# Patient Record
Sex: MALE | Race: WHITE | NOT HISPANIC OR LATINO | Employment: OTHER | URBAN - METROPOLITAN AREA
[De-identification: names, ages, dates, MRNs, and addresses within clinical notes are randomized per-mention and may not be internally consistent; named-entity substitution may affect disease eponyms.]

---

## 2023-05-22 ENCOUNTER — HOSPITAL ENCOUNTER (OUTPATIENT)
Dept: DATA CONVERSION | Facility: HOSPITAL | Age: 68
End: 2023-05-22
Attending: STUDENT IN AN ORGANIZED HEALTH CARE EDUCATION/TRAINING PROGRAM | Admitting: STUDENT IN AN ORGANIZED HEALTH CARE EDUCATION/TRAINING PROGRAM
Payer: MEDICARE

## 2023-05-22 DIAGNOSIS — I10 ESSENTIAL (PRIMARY) HYPERTENSION: ICD-10-CM

## 2023-05-22 DIAGNOSIS — E11.9 TYPE 2 DIABETES MELLITUS WITHOUT COMPLICATIONS (MULTI): ICD-10-CM

## 2023-05-22 DIAGNOSIS — G47.33 OBSTRUCTIVE SLEEP APNEA (ADULT) (PEDIATRIC): ICD-10-CM

## 2023-05-22 DIAGNOSIS — I82.412 ACUTE EMBOLISM AND THROMBOSIS OF LEFT FEMORAL VEIN (MULTI): ICD-10-CM

## 2023-09-07 PROBLEM — G56.00 CARPAL TUNNEL SYNDROME: Status: ACTIVE | Noted: 2023-09-07

## 2023-09-07 PROBLEM — R29.818 SUSPECTED SLEEP APNEA: Status: ACTIVE | Noted: 2023-09-07

## 2023-09-07 PROBLEM — R06.83 SNORING: Status: ACTIVE | Noted: 2023-09-07

## 2023-09-07 PROBLEM — I10 BENIGN ESSENTIAL HYPERTENSION: Status: ACTIVE | Noted: 2023-09-07

## 2023-09-07 PROBLEM — K59.09 CHRONIC CONSTIPATION: Status: ACTIVE | Noted: 2023-09-07

## 2023-09-07 PROBLEM — F79 INTELLECTUAL DISABILITY: Status: ACTIVE | Noted: 2023-09-07

## 2023-09-07 PROBLEM — R33.9 BLADDER RETENTION OF URINE: Status: ACTIVE | Noted: 2023-09-07

## 2023-09-07 PROBLEM — N40.1 BENIGN NODULAR PROSTATIC HYPERPLASIA WITH LOWER URINARY TRACT SYMPTOMS: Status: ACTIVE | Noted: 2023-09-07

## 2023-09-07 PROBLEM — F43.10 PTSD (POST-TRAUMATIC STRESS DISORDER): Status: ACTIVE | Noted: 2023-09-07

## 2023-09-07 PROBLEM — G40.909 NONINTRACTABLE EPILEPSY WITHOUT STATUS EPILEPTICUS (MULTI): Status: ACTIVE | Noted: 2023-09-07
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
## 2023-09-07 PROBLEM — H25.9 AGE-RELATED CATARACT OF BOTH EYES: Status: ACTIVE | Noted: 2023-09-07

## 2023-09-07 PROBLEM — M19.90 ARTHRITIS: Status: ACTIVE | Noted: 2023-09-07

## 2023-09-07 PROBLEM — N40.0 ENLARGED PROSTATE WITHOUT LOWER URINARY TRACT SYMPTOMS (LUTS): Status: ACTIVE | Noted: 2023-09-07

## 2023-09-07 PROBLEM — R76.8 POSITIVE GAD ANTIBODY: Status: ACTIVE | Noted: 2023-09-07

## 2023-09-07 PROBLEM — F41.1 GENERALIZED ANXIETY DISORDER: Status: ACTIVE | Noted: 2023-09-07

## 2023-09-07 PROBLEM — F32.9 MDD (MAJOR DEPRESSIVE DISORDER): Status: ACTIVE | Noted: 2023-09-07

## 2023-09-07 PROBLEM — E87.20 LACTIC ACIDOSIS: Status: ACTIVE | Noted: 2023-09-07

## 2023-09-07 PROBLEM — Z96.612 STATUS POST LEFT SHOULDER HEMIARTHROPLASTY: Status: ACTIVE | Noted: 2023-09-07

## 2023-09-07 PROBLEM — F41.9 ANXIETY: Status: ACTIVE | Noted: 2023-09-07

## 2023-09-07 PROBLEM — K92.2 GI BLEED: Status: ACTIVE | Noted: 2023-09-07

## 2023-09-07 PROBLEM — S22.49XA MULTIPLE RIB FRACTURES: Status: ACTIVE | Noted: 2023-09-07

## 2023-09-07 PROBLEM — E11.42 DIABETIC POLYNEUROPATHY ASSOCIATED WITH TYPE 2 DIABETES MELLITUS (MULTI): Status: ACTIVE | Noted: 2023-09-07

## 2023-09-07 PROBLEM — G89.29 OTHER CHRONIC PAIN: Status: ACTIVE | Noted: 2023-09-07

## 2023-09-07 PROBLEM — E66.01 OBESITY, MORBID (MULTI): Status: ACTIVE | Noted: 2023-09-07

## 2023-09-07 PROBLEM — G40.909 SEIZURE DISORDER (MULTI): Status: ACTIVE | Noted: 2023-09-07

## 2023-09-07 PROBLEM — L72.3 SEBACEOUS CYST: Status: ACTIVE | Noted: 2023-09-07

## 2023-09-07 PROBLEM — I73.9 PVD (PERIPHERAL VASCULAR DISEASE) (CMS-HCC): Status: ACTIVE | Noted: 2023-09-07

## 2023-09-07 PROBLEM — R26.9 ABNORMAL GAIT: Status: ACTIVE | Noted: 2023-09-07

## 2023-09-07 PROBLEM — S93.314A DISLOCATION OF RIGHT SUBTALAR JOINT: Status: ACTIVE | Noted: 2023-09-07

## 2023-09-07 PROBLEM — H90.3 SENSORINEURAL HEARING LOSS (SNHL) OF BOTH EARS: Status: ACTIVE | Noted: 2023-09-07

## 2023-09-07 PROBLEM — R39.198 ABNORMAL URINATION: Status: ACTIVE | Noted: 2023-09-07

## 2023-09-07 PROBLEM — M81.0 OSTEOPOROSIS: Status: ACTIVE | Noted: 2023-09-07

## 2023-09-07 PROBLEM — S72.102A: Status: ACTIVE | Noted: 2023-09-07

## 2023-09-07 PROBLEM — J30.9 ALLERGIC RHINITIS: Status: ACTIVE | Noted: 2023-09-07

## 2023-09-07 PROBLEM — R33.9 URINARY RETENTION WITH INCOMPLETE BLADDER EMPTYING: Status: ACTIVE | Noted: 2023-09-07

## 2023-09-07 PROBLEM — Z04.9 CONDITION NOT FOUND: Status: ACTIVE | Noted: 2023-09-07

## 2023-09-07 PROBLEM — H93.13 BILATERAL TINNITUS: Status: ACTIVE | Noted: 2023-09-07

## 2023-09-07 PROBLEM — G47.00 INSOMNIA: Status: ACTIVE | Noted: 2023-09-07

## 2023-09-07 PROBLEM — E11.9 TYPE 2 DIABETES MELLITUS WITHOUT COMPLICATION (MULTI): Status: ACTIVE | Noted: 2023-09-07

## 2023-09-07 PROBLEM — S42.302A LEFT HUMERAL FRACTURE: Status: ACTIVE | Noted: 2023-09-07

## 2023-09-07 PROBLEM — G47.33 OBSTRUCTIVE SLEEP APNEA: Status: ACTIVE | Noted: 2023-09-07

## 2023-09-07 PROBLEM — H60.8X3 CHRONIC ECZEMATOUS OTITIS EXTERNA OF BOTH EARS: Status: ACTIVE | Noted: 2023-09-07

## 2023-09-07 PROBLEM — M25.462: Status: ACTIVE | Noted: 2023-09-07

## 2023-09-07 PROBLEM — H91.90 HEARING LOSS: Status: ACTIVE | Noted: 2023-09-07

## 2023-09-07 LAB
ALANINE AMINOTRANSFERASE (SGPT) (U/L) IN SER/PLAS: 16 U/L (ref 10–52)
ALBUMIN (G/DL) IN SER/PLAS: 4.1 G/DL (ref 3.4–5)
ALKALINE PHOSPHATASE (U/L) IN SER/PLAS: 161 U/L (ref 33–136)
ANION GAP IN SER/PLAS: 16 MMOL/L (ref 10–20)
ASPARTATE AMINOTRANSFERASE (SGOT) (U/L) IN SER/PLAS: 15 U/L (ref 9–39)
BASOPHILS (10*3/UL) IN BLOOD BY AUTOMATED COUNT: 0.04 X10E9/L (ref 0–0.1)
BASOPHILS/100 LEUKOCYTES IN BLOOD BY AUTOMATED COUNT: 0.6 % (ref 0–2)
BILIRUBIN TOTAL (MG/DL) IN SER/PLAS: 0.4 MG/DL (ref 0–1.2)
CALCIUM (MG/DL) IN SER/PLAS: 9 MG/DL (ref 8.6–10.3)
CARBON DIOXIDE, TOTAL (MMOL/L) IN SER/PLAS: 22 MMOL/L (ref 21–32)
CHLORIDE (MMOL/L) IN SER/PLAS: 103 MMOL/L (ref 98–107)
CHOLESTEROL (MG/DL) IN SER/PLAS: 141 MG/DL (ref 0–199)
CHOLESTEROL IN HDL (MG/DL) IN SER/PLAS: 49.9 MG/DL
CHOLESTEROL/HDL RATIO: 2.8
CREATININE (MG/DL) IN SER/PLAS: 0.83 MG/DL (ref 0.5–1.3)
EOSINOPHILS (10*3/UL) IN BLOOD BY AUTOMATED COUNT: 0.08 X10E9/L (ref 0–0.7)
EOSINOPHILS/100 LEUKOCYTES IN BLOOD BY AUTOMATED COUNT: 1.3 % (ref 0–6)
ERYTHROCYTE DISTRIBUTION WIDTH (RATIO) BY AUTOMATED COUNT: 13.7 % (ref 11.5–14.5)
ERYTHROCYTE MEAN CORPUSCULAR HEMOGLOBIN CONCENTRATION (G/DL) BY AUTOMATED: 29.7 G/DL (ref 32–36)
ERYTHROCYTE MEAN CORPUSCULAR VOLUME (FL) BY AUTOMATED COUNT: 97 FL (ref 80–100)
ERYTHROCYTES (10*6/UL) IN BLOOD BY AUTOMATED COUNT: 4.81 X10E12/L (ref 4.5–5.9)
GFR MALE: >90 ML/MIN/1.73M2
GLUCOSE (MG/DL) IN SER/PLAS: 143 MG/DL (ref 74–99)
HEMATOCRIT (%) IN BLOOD BY AUTOMATED COUNT: 46.8 % (ref 41–52)
HEMOGLOBIN (G/DL) IN BLOOD: 13.9 G/DL (ref 13.5–17.5)
IMMATURE GRANULOCYTES/100 LEUKOCYTES IN BLOOD BY AUTOMATED COUNT: 0.3 % (ref 0–0.9)
LDL: 69 MG/DL (ref 0–99)
LEUKOCYTES (10*3/UL) IN BLOOD BY AUTOMATED COUNT: 6.4 X10E9/L (ref 4.4–11.3)
LYMPHOCYTES (10*3/UL) IN BLOOD BY AUTOMATED COUNT: 1.81 X10E9/L (ref 1.2–4.8)
LYMPHOCYTES/100 LEUKOCYTES IN BLOOD BY AUTOMATED COUNT: 28.5 % (ref 13–44)
MONOCYTES (10*3/UL) IN BLOOD BY AUTOMATED COUNT: 0.57 X10E9/L (ref 0.1–1)
MONOCYTES/100 LEUKOCYTES IN BLOOD BY AUTOMATED COUNT: 9 % (ref 2–10)
NEUTROPHILS (10*3/UL) IN BLOOD BY AUTOMATED COUNT: 3.83 X10E9/L (ref 1.2–7.7)
NEUTROPHILS/100 LEUKOCYTES IN BLOOD BY AUTOMATED COUNT: 60.3 % (ref 40–80)
PLATELETS (10*3/UL) IN BLOOD AUTOMATED COUNT: 300 X10E9/L (ref 150–450)
POTASSIUM (MMOL/L) IN SER/PLAS: 5 MMOL/L (ref 3.5–5.3)
PROTEIN TOTAL: 7 G/DL (ref 6.4–8.2)
SODIUM (MMOL/L) IN SER/PLAS: 136 MMOL/L (ref 136–145)
THYROTROPIN (MIU/L) IN SER/PLAS BY DETECTION LIMIT <= 0.05 MIU/L: 2.49 MIU/L (ref 0.44–3.98)
TRIGLYCERIDE (MG/DL) IN SER/PLAS: 111 MG/DL (ref 0–149)
UREA NITROGEN (MG/DL) IN SER/PLAS: 18 MG/DL (ref 6–23)
VLDL: 22 MG/DL (ref 0–40)

## 2023-09-07 RX ORDER — LISINOPRIL 5 MG/1
1 TABLET ORAL EVERY 24 HOURS
COMMUNITY
Start: 2018-05-30 | End: 2024-03-14 | Stop reason: SDUPTHER

## 2023-09-07 RX ORDER — LISINOPRIL 10 MG/1
TABLET ORAL
Status: ON HOLD | COMMUNITY
End: 2023-12-08 | Stop reason: ALTCHOICE

## 2023-09-07 RX ORDER — DOCUSATE SODIUM 100 MG/1
1 CAPSULE, LIQUID FILLED ORAL 2 TIMES DAILY
Status: ON HOLD | COMMUNITY
Start: 2016-02-22 | End: 2023-12-08 | Stop reason: ALTCHOICE

## 2023-09-07 RX ORDER — TALC
3 POWDER (GRAM) TOPICAL EVERY 24 HOURS
COMMUNITY
Start: 2018-05-30 | End: 2023-10-09 | Stop reason: WASHOUT

## 2023-09-07 RX ORDER — TRAZODONE HYDROCHLORIDE 100 MG/1
100 TABLET ORAL
Status: ON HOLD | COMMUNITY
Start: 2022-12-09 | End: 2023-12-08 | Stop reason: ALTCHOICE

## 2023-09-07 RX ORDER — PHENYTOIN SODIUM 100 MG/1
2 CAPSULE, EXTENDED RELEASE ORAL 3 TIMES DAILY
COMMUNITY
Start: 2016-02-22 | End: 2024-06-06

## 2023-09-07 RX ORDER — HYDROCODONE BITARTRATE AND ACETAMINOPHEN 5; 325 MG/1; MG/1
TABLET ORAL
Status: ON HOLD | COMMUNITY
End: 2023-12-08 | Stop reason: ALTCHOICE

## 2023-09-07 RX ORDER — NYSTATIN 100000 [USP'U]/G
POWDER TOPICAL 2 TIMES DAILY
Status: ON HOLD | COMMUNITY
Start: 2022-09-16 | End: 2023-12-08 | Stop reason: ALTCHOICE

## 2023-09-07 RX ORDER — PANTOPRAZOLE SODIUM 40 MG/1
1 TABLET, DELAYED RELEASE ORAL EVERY 24 HOURS
COMMUNITY
Start: 2016-06-27 | End: 2024-01-09

## 2023-09-07 RX ORDER — MULTIVIT-MIN/IRON FUM/FOLIC AC 7.5 MG-4
1 TABLET ORAL DAILY
Status: ON HOLD | COMMUNITY
Start: 2016-06-27 | End: 2023-12-08 | Stop reason: ALTCHOICE

## 2023-09-07 RX ORDER — MULTIVITAMIN
1 TABLET ORAL 2 TIMES DAILY
COMMUNITY
Start: 2023-08-11 | End: 2023-12-13

## 2023-09-07 RX ORDER — LISINOPRIL 20 MG/1
TABLET ORAL
Status: ON HOLD | COMMUNITY
Start: 2016-02-22 | End: 2023-12-08 | Stop reason: ALTCHOICE

## 2023-09-07 RX ORDER — CHOLECALCIFEROL (VITAMIN D3) 25 MCG
25 TABLET ORAL DAILY
Status: ON HOLD | COMMUNITY
End: 2023-12-08 | Stop reason: ALTCHOICE

## 2023-09-07 RX ORDER — FINASTERIDE 5 MG/1
1 TABLET, FILM COATED ORAL EVERY 24 HOURS
COMMUNITY
End: 2024-03-13

## 2023-09-07 RX ORDER — VILAZODONE HYDROCHLORIDE 40 MG/1
1 TABLET ORAL EVERY 24 HOURS
COMMUNITY
Start: 2021-09-08 | End: 2024-02-28 | Stop reason: ALTCHOICE

## 2023-09-07 RX ORDER — LORATADINE 10 MG/1
TABLET ORAL
Status: ON HOLD | COMMUNITY
Start: 2016-02-22 | End: 2023-12-08 | Stop reason: ALTCHOICE

## 2023-09-07 RX ORDER — MELATONIN 10 MG
1 CAPSULE ORAL NIGHTLY
COMMUNITY
Start: 2023-05-25

## 2023-09-07 RX ORDER — METHOCARBAMOL 500 MG/1
500 TABLET, FILM COATED ORAL 4 TIMES DAILY
Status: ON HOLD | COMMUNITY
Start: 2023-03-27 | End: 2023-12-08 | Stop reason: ALTCHOICE

## 2023-09-07 RX ORDER — OMEPRAZOLE 40 MG/1
1 CAPSULE, DELAYED RELEASE ORAL DAILY
Status: ON HOLD | COMMUNITY
Start: 2016-02-22 | End: 2023-12-08 | Stop reason: ALTCHOICE

## 2023-09-07 RX ORDER — QUETIAPINE FUMARATE 25 MG/1
25 TABLET, FILM COATED ORAL EVERY 24 HOURS
Status: ON HOLD | COMMUNITY
Start: 2021-09-08 | End: 2023-12-08 | Stop reason: ALTCHOICE

## 2023-09-07 RX ORDER — CELECOXIB 200 MG/1
1 CAPSULE ORAL DAILY
Status: ON HOLD | COMMUNITY
Start: 2016-02-22 | End: 2023-12-08 | Stop reason: ALTCHOICE

## 2023-09-07 RX ORDER — OXYBUTYNIN CHLORIDE 5 MG/1
1 TABLET ORAL 3 TIMES DAILY
COMMUNITY
Start: 2023-08-05

## 2023-09-07 RX ORDER — FUROSEMIDE 20 MG/1
TABLET ORAL
Status: ON HOLD | COMMUNITY
Start: 2016-02-22 | End: 2023-12-08 | Stop reason: ALTCHOICE

## 2023-09-07 RX ORDER — PHENYTOIN SODIUM 200 MG/1
200 CAPSULE, EXTENDED RELEASE ORAL 3 TIMES DAILY
Status: ON HOLD | COMMUNITY
End: 2023-12-08 | Stop reason: ALTCHOICE

## 2023-09-07 RX ORDER — SERTRALINE HYDROCHLORIDE 50 MG/1
1 TABLET, FILM COATED ORAL DAILY
Status: ON HOLD | COMMUNITY
Start: 2016-02-22 | End: 2023-12-08 | Stop reason: ALTCHOICE

## 2023-09-07 RX ORDER — BUSPIRONE HYDROCHLORIDE 10 MG/1
1 TABLET ORAL EVERY 8 HOURS
COMMUNITY
Start: 2021-09-08

## 2023-09-07 RX ORDER — ACETAMINOPHEN 500 MG/1
650 TABLET ORAL EVERY 8 HOURS PRN
Status: ON HOLD | COMMUNITY
Start: 2023-01-18 | End: 2023-12-08 | Stop reason: ALTCHOICE

## 2023-09-07 RX ORDER — ASPIRIN 81 MG/1
1 TABLET ORAL DAILY
COMMUNITY
Start: 2016-02-22

## 2023-09-07 RX ORDER — METFORMIN HYDROCHLORIDE 500 MG/1
500 TABLET, EXTENDED RELEASE ORAL
Status: ON HOLD | COMMUNITY
Start: 2016-02-22 | End: 2023-12-08 | Stop reason: ALTCHOICE

## 2023-09-07 RX ORDER — TAMSULOSIN HYDROCHLORIDE 0.4 MG/1
0.4 CAPSULE ORAL DAILY
Status: ON HOLD | COMMUNITY
Start: 2016-02-22 | End: 2023-12-08 | Stop reason: ALTCHOICE

## 2023-09-07 RX ORDER — BLOOD-GLUCOSE CONTROL, NORMAL
EACH MISCELLANEOUS
Status: ON HOLD | COMMUNITY
Start: 2022-05-16 | End: 2023-12-08 | Stop reason: ALTCHOICE

## 2023-09-07 RX ORDER — ALPRAZOLAM 0.25 MG/1
TABLET ORAL
Status: ON HOLD | COMMUNITY
Start: 2016-02-22 | End: 2023-12-08 | Stop reason: ALTCHOICE

## 2023-09-07 RX ORDER — MENTHOL 40 MG/ML
GEL TOPICAL 4 TIMES DAILY PRN
Status: ON HOLD | COMMUNITY
End: 2023-12-08 | Stop reason: ALTCHOICE

## 2023-09-07 RX ORDER — OXYCODONE HYDROCHLORIDE 5 MG/1
5 TABLET ORAL EVERY 4 HOURS PRN
Status: ON HOLD | COMMUNITY
Start: 2016-02-22 | End: 2023-12-08 | Stop reason: ALTCHOICE

## 2023-09-07 RX ORDER — TRAZODONE HYDROCHLORIDE 50 MG/1
1 TABLET ORAL EVERY 24 HOURS
COMMUNITY
Start: 2016-02-22

## 2023-09-07 RX ORDER — POLYETHYLENE GLYCOL 3350 17 G/17G
17 POWDER, FOR SOLUTION ORAL
Status: ON HOLD | COMMUNITY
Start: 2016-02-22 | End: 2023-12-08 | Stop reason: ALTCHOICE

## 2023-09-07 RX ORDER — FLUTICASONE PROPIONATE 50 MCG
2 SPRAY, SUSPENSION (ML) NASAL DAILY
COMMUNITY
End: 2024-05-30 | Stop reason: SDUPTHER

## 2023-09-08 LAB — PROSTATE SPECIFIC AG (NG/ML) IN SER/PLAS: 0.22 NG/ML (ref 0–4)

## 2023-10-09 PROBLEM — E11.49 DIABETES MELLITUS TYPE 2 WITH NEUROLOGICAL MANIFESTATIONS (MULTI): Status: ACTIVE | Noted: 2023-10-09

## 2023-10-09 RX ORDER — MELATONIN 3 MG
CAPSULE ORAL EVERY 24 HOURS
Status: ON HOLD | COMMUNITY
Start: 2018-05-30 | End: 2023-12-08 | Stop reason: ALTCHOICE

## 2023-10-09 RX ORDER — MONTELUKAST SODIUM 4 MG/1
TABLET, CHEWABLE ORAL EVERY 24 HOURS
COMMUNITY
Start: 2023-09-07 | End: 2024-01-09

## 2023-10-11 ENCOUNTER — ANCILLARY PROCEDURE (OUTPATIENT)
Dept: RADIOLOGY | Facility: CLINIC | Age: 68
End: 2023-10-11
Payer: MEDICARE

## 2023-10-11 ENCOUNTER — OFFICE VISIT (OUTPATIENT)
Dept: ORTHOPEDIC SURGERY | Facility: CLINIC | Age: 68
End: 2023-10-11
Payer: MEDICARE

## 2023-10-11 DIAGNOSIS — Z96.649 PERIPROSTHETIC FRACTURE OF SHAFT OF FEMUR: ICD-10-CM

## 2023-10-11 DIAGNOSIS — M97.8XXA PERIPROSTHETIC FRACTURE OF SHAFT OF FEMUR: ICD-10-CM

## 2023-10-11 PROCEDURE — 73552 X-RAY EXAM OF FEMUR 2/>: CPT | Mod: LEFT SIDE | Performed by: RADIOLOGY

## 2023-10-11 PROCEDURE — 99213 OFFICE O/P EST LOW 20 MIN: CPT | Performed by: ORTHOPAEDIC SURGERY

## 2023-10-11 PROCEDURE — 3008F BODY MASS INDEX DOCD: CPT | Performed by: ORTHOPAEDIC SURGERY

## 2023-10-11 PROCEDURE — 4010F ACE/ARB THERAPY RXD/TAKEN: CPT | Performed by: ORTHOPAEDIC SURGERY

## 2023-10-11 PROCEDURE — 73552 X-RAY EXAM OF FEMUR 2/>: CPT | Mod: LT

## 2023-10-11 NOTE — PROGRESS NOTES
Shon Boyd Jr. is 10 months post-op from removal of hardware and intramedullary nail left femur fracture December 2023 he is doing well at this point.   Denies fevers or chills.  Denies drainage from the wound.  he reports no additional symptoms or concerns. No shortness of breath or chest pain. No calf swelling or pain.  He mostly is using a walker and a wheelchair.  He lives in a assisted living.    The patients full medical history, surgical history, medications, allergies, family, medical history, social history, and a complete 14 point review of systems is documented in the medical record on the signed, scanned medical intake sheet or reviewed in the history of present illness. Review of systems otherwise negative    No past medical history on file.    Medication Documentation Review Audit    **Prior to Admission medications have not yet been reviewed**         No Known Allergies    Social History     Socioeconomic History    Marital status:      Spouse name: Not on file    Number of children: Not on file    Years of education: Not on file    Highest education level: Not on file   Occupational History    Not on file   Tobacco Use    Smoking status: Not on file    Smokeless tobacco: Not on file   Substance and Sexual Activity    Alcohol use: Not on file    Drug use: Not on file    Sexual activity: Not on file   Other Topics Concern    Not on file   Social History Narrative    Not on file     Social Determinants of Health     Financial Resource Strain: Not on file   Food Insecurity: Not on file   Transportation Needs: Not on file   Physical Activity: Not on file   Stress: Not on file   Social Connections: Not on file   Intimate Partner Violence: Not on file   Housing Stability: Not on file       Past Surgical History:   Procedure Laterality Date    CT GUIDED PERCUTANEOUS BIOPSY LUNG  8/8/2012    CT GUIDED PERCUTANEOUS BIOPSY LUNG 8/8/2012 [facility] LEGACY       Gen: The patient is alert and oriented ×3, is  in no acute distress, and appear their stated age and weight.    Psychiatric: Mood and affect are appropriate.    Eyes: Sclera are white, and pupils are round and symmetric.    ENT: Mucous membranes are moist.     Neck: Supple. Thyroid is midline.    Respiratory: Respirations are nonlabored, chest rise is symmetric.    Cardiac: Rate is regular by palpation of distal pulses.     Abdomen: Nondistended.    Integument: No obvious cutaneous lesions are noted. No signs of lymphangitis. No signs of systemic edema.  side: left lower extremity :  his  surgical incisions are healing well, without evidence of erythema, fluctuance, drainage, or infection.  The skin around the incision is intact.  Distally neurovascular exam is stable.  There is appropriate tenderness to palpation in the sonja-incisional area. No calf swelling or tenderness to palpation.      I personally reviewed multiple views of left femur were obtained in the office today demonstrate maintenance of reduction, interval healing, and a stable position of the hardware.  There is one fracture line medial and posterior that has some incomplete healing but anterior and lateral.  Complete healing most likely part of a butterfly fragment.      Shon Boyd Jr. is a 67 y.o. patient status post left femur intramedullary nail and removal of hardware December 2022 I went over his x-rays in detail today. he is WBAT of the side: leftlower extremity. ~He/she~ is range of motion as tolerated of the side: left lower extremity.  I stressed the importance of physical therapy on overall functional outcome.  I would like him to continue physical therapy.  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  He would like to the need to get a total hip arthroplasty in order to alleviate this pain.  The posterior buttock pain just like the from sciatica as it is very sporadic  and shoots down his leg.  He is very low demand and is using a walker at baseline.  There is no evidence of hardware loosening or dynamization and he does not really have any thigh pain to indicate that he does incomplete healing of his fracture.  We will continue to monitor.  I answered all patient's questions he agrees with treatment plan.  I will see him back in Follow-up 6 month(s)with repeat AP pelvis and 2 views of the left femur.  Clemente Renteria    Department of Orthopaedic Trauma Surgery

## 2023-11-17 ENCOUNTER — TELEPHONE (OUTPATIENT)
Dept: PRIMARY CARE | Facility: CLINIC | Age: 68
End: 2023-11-17
Payer: MEDICARE

## 2023-11-17 DIAGNOSIS — S02.5XXA CLOSED FRACTURE OF TOOTH, INITIAL ENCOUNTER: Primary | ICD-10-CM

## 2023-11-17 NOTE — TELEPHONE ENCOUNTER
Patient broke off a piece of his tooth. Insurance requires a referral from PCP. Please email dentist referral to: [email or web address].

## 2023-12-08 ENCOUNTER — APPOINTMENT (OUTPATIENT)
Dept: RADIOLOGY | Facility: HOSPITAL | Age: 68
End: 2023-12-08
Payer: MEDICARE

## 2023-12-08 ENCOUNTER — HOSPITAL ENCOUNTER (OUTPATIENT)
Facility: HOSPITAL | Age: 68
Setting detail: OBSERVATION
Discharge: HOME HEALTH | End: 2023-12-11
Attending: EMERGENCY MEDICINE | Admitting: INTERNAL MEDICINE
Payer: MEDICARE

## 2023-12-08 DIAGNOSIS — L03.119 CELLULITIS OF FOOT: ICD-10-CM

## 2023-12-08 DIAGNOSIS — L03.90 CELLULITIS: Primary | ICD-10-CM

## 2023-12-08 DIAGNOSIS — Z96.649 PERIPROSTHETIC FRACTURE OF SHAFT OF FEMUR: ICD-10-CM

## 2023-12-08 DIAGNOSIS — M97.8XXA PERIPROSTHETIC FRACTURE OF SHAFT OF FEMUR: ICD-10-CM

## 2023-12-08 LAB
ANION GAP SERPL CALC-SCNC: 12 MMOL/L (ref 10–20)
BASOPHILS # BLD AUTO: 0.04 X10*3/UL (ref 0–0.1)
BASOPHILS NFR BLD AUTO: 0.5 %
BUN SERPL-MCNC: 23 MG/DL (ref 6–23)
CALCIUM SERPL-MCNC: 8.6 MG/DL (ref 8.6–10.3)
CHLORIDE SERPL-SCNC: 103 MMOL/L (ref 98–107)
CO2 SERPL-SCNC: 26 MMOL/L (ref 21–32)
CREAT SERPL-MCNC: 0.78 MG/DL (ref 0.5–1.3)
EOSINOPHIL # BLD AUTO: 0.13 X10*3/UL (ref 0–0.7)
EOSINOPHIL NFR BLD AUTO: 1.7 %
ERYTHROCYTE [DISTWIDTH] IN BLOOD BY AUTOMATED COUNT: 14 % (ref 11.5–14.5)
GFR SERPL CREATININE-BSD FRML MDRD: >90 ML/MIN/1.73M*2
GLUCOSE BLD MANUAL STRIP-MCNC: 107 MG/DL (ref 74–99)
GLUCOSE BLD MANUAL STRIP-MCNC: 165 MG/DL (ref 74–99)
GLUCOSE BLD MANUAL STRIP-MCNC: 91 MG/DL (ref 74–99)
GLUCOSE SERPL-MCNC: 105 MG/DL (ref 74–99)
HCT VFR BLD AUTO: 43.9 % (ref 41–52)
HGB BLD-MCNC: 13.8 G/DL (ref 13.5–17.5)
IMM GRANULOCYTES # BLD AUTO: 0.02 X10*3/UL (ref 0–0.7)
IMM GRANULOCYTES NFR BLD AUTO: 0.3 % (ref 0–0.9)
LACTATE SERPL-SCNC: 1.1 MMOL/L (ref 0.4–2)
LYMPHOCYTES # BLD AUTO: 1.48 X10*3/UL (ref 1.2–4.8)
LYMPHOCYTES NFR BLD AUTO: 19.6 %
MCH RBC QN AUTO: 29.4 PG (ref 26–34)
MCHC RBC AUTO-ENTMCNC: 31.4 G/DL (ref 32–36)
MCV RBC AUTO: 93 FL (ref 80–100)
MONOCYTES # BLD AUTO: 0.62 X10*3/UL (ref 0.1–1)
MONOCYTES NFR BLD AUTO: 8.2 %
NEUTROPHILS # BLD AUTO: 5.27 X10*3/UL (ref 1.2–7.7)
NEUTROPHILS NFR BLD AUTO: 69.7 %
NRBC BLD-RTO: 0 /100 WBCS (ref 0–0)
PLATELET # BLD AUTO: 262 X10*3/UL (ref 150–450)
POTASSIUM SERPL-SCNC: 5 MMOL/L (ref 3.5–5.3)
RBC # BLD AUTO: 4.7 X10*6/UL (ref 4.5–5.9)
SODIUM SERPL-SCNC: 136 MMOL/L (ref 136–145)
WBC # BLD AUTO: 7.6 X10*3/UL (ref 4.4–11.3)

## 2023-12-08 PROCEDURE — 2500000001 HC RX 250 WO HCPCS SELF ADMINISTERED DRUGS (ALT 637 FOR MEDICARE OP): Performed by: FAMILY MEDICINE

## 2023-12-08 PROCEDURE — 73590 X-RAY EXAM OF LOWER LEG: CPT | Mod: RIGHT SIDE | Performed by: RADIOLOGY

## 2023-12-08 PROCEDURE — 96372 THER/PROPH/DIAG INJ SC/IM: CPT | Performed by: FAMILY MEDICINE

## 2023-12-08 PROCEDURE — G0378 HOSPITAL OBSERVATION PER HR: HCPCS

## 2023-12-08 PROCEDURE — 85025 COMPLETE CBC W/AUTO DIFF WBC: CPT | Performed by: EMERGENCY MEDICINE

## 2023-12-08 PROCEDURE — 83605 ASSAY OF LACTIC ACID: CPT | Performed by: EMERGENCY MEDICINE

## 2023-12-08 PROCEDURE — 36415 COLL VENOUS BLD VENIPUNCTURE: CPT | Performed by: EMERGENCY MEDICINE

## 2023-12-08 PROCEDURE — 73630 X-RAY EXAM OF FOOT: CPT | Mod: RT,FY

## 2023-12-08 PROCEDURE — 73630 X-RAY EXAM OF FOOT: CPT | Mod: RIGHT SIDE | Performed by: RADIOLOGY

## 2023-12-08 PROCEDURE — 73590 X-RAY EXAM OF LOWER LEG: CPT | Mod: RT

## 2023-12-08 PROCEDURE — 73700 CT LOWER EXTREMITY W/O DYE: CPT | Mod: RIGHT SIDE | Performed by: RADIOLOGY

## 2023-12-08 PROCEDURE — 2500000004 HC RX 250 GENERAL PHARMACY W/ HCPCS (ALT 636 FOR OP/ED): Performed by: FAMILY MEDICINE

## 2023-12-08 PROCEDURE — 82947 ASSAY GLUCOSE BLOOD QUANT: CPT

## 2023-12-08 PROCEDURE — 82947 ASSAY GLUCOSE BLOOD QUANT: CPT | Performed by: EMERGENCY MEDICINE

## 2023-12-08 PROCEDURE — 99285 EMERGENCY DEPT VISIT HI MDM: CPT | Performed by: EMERGENCY MEDICINE

## 2023-12-08 PROCEDURE — 99223 1ST HOSP IP/OBS HIGH 75: CPT | Performed by: FAMILY MEDICINE

## 2023-12-08 PROCEDURE — 73700 CT LOWER EXTREMITY W/O DYE: CPT | Mod: RT

## 2023-12-08 PROCEDURE — 87075 CULTR BACTERIA EXCEPT BLOOD: CPT | Mod: GEALAB | Performed by: EMERGENCY MEDICINE

## 2023-12-08 PROCEDURE — 2500000004 HC RX 250 GENERAL PHARMACY W/ HCPCS (ALT 636 FOR OP/ED): Performed by: EMERGENCY MEDICINE

## 2023-12-08 PROCEDURE — 2500000002 HC RX 250 W HCPCS SELF ADMINISTERED DRUGS (ALT 637 FOR MEDICARE OP, ALT 636 FOR OP/ED): Performed by: FAMILY MEDICINE

## 2023-12-08 RX ORDER — PANTOPRAZOLE SODIUM 40 MG/1
40 TABLET, DELAYED RELEASE ORAL EVERY 24 HOURS
Status: DISCONTINUED | OUTPATIENT
Start: 2023-12-08 | End: 2023-12-11 | Stop reason: HOSPADM

## 2023-12-08 RX ORDER — PHENYTOIN SODIUM 100 MG/1
200 CAPSULE, EXTENDED RELEASE ORAL 3 TIMES DAILY
Status: DISCONTINUED | OUTPATIENT
Start: 2023-12-08 | End: 2023-12-11 | Stop reason: HOSPADM

## 2023-12-08 RX ORDER — NYSTATIN 100000 U/G
CREAM TOPICAL 2 TIMES DAILY
Status: DISCONTINUED | OUTPATIENT
Start: 2023-12-08 | End: 2023-12-11 | Stop reason: HOSPADM

## 2023-12-08 RX ORDER — ACETAMINOPHEN 160 MG/5ML
650 SOLUTION ORAL EVERY 4 HOURS PRN
Status: DISCONTINUED | OUTPATIENT
Start: 2023-12-08 | End: 2023-12-11 | Stop reason: HOSPADM

## 2023-12-08 RX ORDER — FERROUS SULFATE, DRIED 160(50) MG
1 TABLET, EXTENDED RELEASE ORAL 2 TIMES DAILY
Status: DISCONTINUED | OUTPATIENT
Start: 2023-12-08 | End: 2023-12-11 | Stop reason: HOSPADM

## 2023-12-08 RX ORDER — FINASTERIDE 5 MG/1
5 TABLET, FILM COATED ORAL EVERY 24 HOURS
Status: DISCONTINUED | OUTPATIENT
Start: 2023-12-08 | End: 2023-12-11 | Stop reason: HOSPADM

## 2023-12-08 RX ORDER — ENOXAPARIN SODIUM 100 MG/ML
40 INJECTION SUBCUTANEOUS EVERY 24 HOURS
Status: DISCONTINUED | OUTPATIENT
Start: 2023-12-08 | End: 2023-12-11 | Stop reason: HOSPADM

## 2023-12-08 RX ORDER — DEXTROSE MONOHYDRATE 100 MG/ML
0.3 INJECTION, SOLUTION INTRAVENOUS ONCE AS NEEDED
Status: DISCONTINUED | OUTPATIENT
Start: 2023-12-08 | End: 2023-12-11 | Stop reason: HOSPADM

## 2023-12-08 RX ORDER — POLYETHYLENE GLYCOL 3350 17 G/17G
17 POWDER, FOR SOLUTION ORAL DAILY
Status: DISCONTINUED | OUTPATIENT
Start: 2023-12-08 | End: 2023-12-11 | Stop reason: HOSPADM

## 2023-12-08 RX ORDER — ASPIRIN 81 MG/1
81 TABLET ORAL DAILY
Status: DISCONTINUED | OUTPATIENT
Start: 2023-12-08 | End: 2023-12-11 | Stop reason: HOSPADM

## 2023-12-08 RX ORDER — ACETAMINOPHEN 325 MG/1
650 TABLET ORAL EVERY 4 HOURS PRN
Status: DISCONTINUED | OUTPATIENT
Start: 2023-12-08 | End: 2023-12-11 | Stop reason: HOSPADM

## 2023-12-08 RX ORDER — OXYBUTYNIN CHLORIDE 5 MG/1
5 TABLET ORAL 3 TIMES DAILY
Status: DISCONTINUED | OUTPATIENT
Start: 2023-12-08 | End: 2023-12-11 | Stop reason: HOSPADM

## 2023-12-08 RX ORDER — FLUTICASONE PROPIONATE 50 MCG
2 SPRAY, SUSPENSION (ML) NASAL DAILY
Status: DISCONTINUED | OUTPATIENT
Start: 2023-12-08 | End: 2023-12-11 | Stop reason: HOSPADM

## 2023-12-08 RX ORDER — ACETAMINOPHEN 650 MG/1
650 SUPPOSITORY RECTAL EVERY 4 HOURS PRN
Status: DISCONTINUED | OUTPATIENT
Start: 2023-12-08 | End: 2023-12-11 | Stop reason: HOSPADM

## 2023-12-08 RX ORDER — IBUPROFEN 200 MG
400 TABLET ORAL EVERY 4 HOURS PRN
Status: DISCONTINUED | OUTPATIENT
Start: 2023-12-08 | End: 2023-12-11 | Stop reason: HOSPADM

## 2023-12-08 RX ORDER — TRAZODONE HYDROCHLORIDE 50 MG/1
50 TABLET ORAL EVERY 24 HOURS
Status: DISCONTINUED | OUTPATIENT
Start: 2023-12-08 | End: 2023-12-11 | Stop reason: HOSPADM

## 2023-12-08 RX ORDER — LISINOPRIL 5 MG/1
5 TABLET ORAL EVERY 24 HOURS
Status: DISCONTINUED | OUTPATIENT
Start: 2023-12-08 | End: 2023-12-11 | Stop reason: HOSPADM

## 2023-12-08 RX ORDER — INSULIN LISPRO 100 [IU]/ML
0-10 INJECTION, SOLUTION INTRAVENOUS; SUBCUTANEOUS
Status: DISCONTINUED | OUTPATIENT
Start: 2023-12-08 | End: 2023-12-11 | Stop reason: HOSPADM

## 2023-12-08 RX ORDER — TALC
6 POWDER (GRAM) TOPICAL NIGHTLY PRN
Status: DISCONTINUED | OUTPATIENT
Start: 2023-12-08 | End: 2023-12-11 | Stop reason: HOSPADM

## 2023-12-08 RX ORDER — DEXTROSE 50 % IN WATER (D50W) INTRAVENOUS SYRINGE
25
Status: DISCONTINUED | OUTPATIENT
Start: 2023-12-08 | End: 2023-12-11 | Stop reason: HOSPADM

## 2023-12-08 RX ORDER — LORATADINE 10 MG/1
10 TABLET ORAL DAILY
Status: DISCONTINUED | OUTPATIENT
Start: 2023-12-08 | End: 2023-12-11 | Stop reason: HOSPADM

## 2023-12-08 RX ORDER — BUSPIRONE HYDROCHLORIDE 10 MG/1
10 TABLET ORAL EVERY 8 HOURS
Status: DISCONTINUED | OUTPATIENT
Start: 2023-12-08 | End: 2023-12-11 | Stop reason: HOSPADM

## 2023-12-08 RX ORDER — VILAZODONE HYDROCHLORIDE 20 MG/1
40 TABLET ORAL EVERY 24 HOURS
Status: DISCONTINUED | OUTPATIENT
Start: 2023-12-08 | End: 2023-12-11 | Stop reason: HOSPADM

## 2023-12-08 RX ADMIN — Medication 1 TABLET: at 12:24

## 2023-12-08 RX ADMIN — Medication 1 TABLET: at 20:18

## 2023-12-08 RX ADMIN — ENOXAPARIN SODIUM 40 MG: 40 INJECTION SUBCUTANEOUS at 12:25

## 2023-12-08 RX ADMIN — PHENYTOIN SODIUM 200 MG: 100 CAPSULE ORAL at 21:54

## 2023-12-08 RX ADMIN — OXYBUTYNIN CHLORIDE 5 MG: 5 TABLET ORAL at 12:24

## 2023-12-08 RX ADMIN — OXYBUTYNIN CHLORIDE 5 MG: 5 TABLET ORAL at 20:18

## 2023-12-08 RX ADMIN — INSULIN LISPRO 2 UNITS: 100 INJECTION, SOLUTION INTRAVENOUS; SUBCUTANEOUS at 17:21

## 2023-12-08 RX ADMIN — Medication 6 MG: at 20:18

## 2023-12-08 RX ADMIN — PIPERACILLIN SODIUM AND TAZOBACTAM SODIUM 3.38 G: 3; .375 INJECTION, SOLUTION INTRAVENOUS at 23:29

## 2023-12-08 RX ADMIN — FINASTERIDE 5 MG: 5 TABLET, FILM COATED ORAL at 12:24

## 2023-12-08 RX ADMIN — IBUPROFEN 400 MG: 200 TABLET, FILM COATED ORAL at 20:17

## 2023-12-08 RX ADMIN — PHENYTOIN SODIUM 200 MG: 100 CAPSULE ORAL at 17:07

## 2023-12-08 RX ADMIN — BUSPIRONE HYDROCHLORIDE 10 MG: 10 TABLET ORAL at 20:18

## 2023-12-08 RX ADMIN — TRAZODONE HYDROCHLORIDE 50 MG: 50 TABLET ORAL at 12:24

## 2023-12-08 RX ADMIN — OXYBUTYNIN CHLORIDE 5 MG: 5 TABLET ORAL at 17:05

## 2023-12-08 RX ADMIN — LORATADINE 10 MG: 10 TABLET ORAL at 12:24

## 2023-12-08 RX ADMIN — PANTOPRAZOLE SODIUM 40 MG: 40 TABLET, DELAYED RELEASE ORAL at 12:24

## 2023-12-08 RX ADMIN — PIPERACILLIN SODIUM AND TAZOBACTAM SODIUM 3.38 G: 3; .375 INJECTION, SOLUTION INTRAVENOUS at 04:35

## 2023-12-08 RX ADMIN — LISINOPRIL 5 MG: 5 TABLET ORAL at 12:24

## 2023-12-08 RX ADMIN — PIPERACILLIN SODIUM AND TAZOBACTAM SODIUM 3.38 G: 3; .375 INJECTION, SOLUTION INTRAVENOUS at 12:25

## 2023-12-08 RX ADMIN — VILAZODONE HYDROCHLORIDE 40 MG: 20 TABLET ORAL at 12:26

## 2023-12-08 RX ADMIN — POLYETHYLENE GLYCOL 3350 17 G: 17 POWDER, FOR SOLUTION ORAL at 12:24

## 2023-12-08 RX ADMIN — ASPIRIN 81 MG: 81 TABLET, COATED ORAL at 12:24

## 2023-12-08 RX ADMIN — PIPERACILLIN SODIUM AND TAZOBACTAM SODIUM 3.38 G: 3; .375 INJECTION, SOLUTION INTRAVENOUS at 17:05

## 2023-12-08 RX ADMIN — PHENYTOIN SODIUM 200 MG: 100 CAPSULE ORAL at 12:26

## 2023-12-08 RX ADMIN — BUSPIRONE HYDROCHLORIDE 10 MG: 10 TABLET ORAL at 12:24

## 2023-12-08 RX ADMIN — FLUTICASONE PROPIONATE 2 SPRAY: 50 SPRAY, METERED NASAL at 12:25

## 2023-12-08 SDOH — SOCIAL STABILITY: SOCIAL INSECURITY: WERE YOU ABLE TO COMPLETE ALL THE BEHAVIORAL HEALTH SCREENINGS?: YES

## 2023-12-08 SDOH — SOCIAL STABILITY: SOCIAL INSECURITY: DO YOU FEEL ANYONE HAS EXPLOITED OR TAKEN ADVANTAGE OF YOU FINANCIALLY OR OF YOUR PERSONAL PROPERTY?: NO

## 2023-12-08 SDOH — SOCIAL STABILITY: SOCIAL INSECURITY: HAS ANYONE EVER THREATENED TO HURT YOUR FAMILY OR YOUR PETS?: NO

## 2023-12-08 SDOH — SOCIAL STABILITY: SOCIAL INSECURITY: ARE YOU OR HAVE YOU BEEN THREATENED OR ABUSED PHYSICALLY, EMOTIONALLY, OR SEXUALLY BY ANYONE?: NO

## 2023-12-08 SDOH — SOCIAL STABILITY: SOCIAL INSECURITY: HAVE YOU HAD THOUGHTS OF HARMING ANYONE ELSE?: NO

## 2023-12-08 SDOH — SOCIAL STABILITY: SOCIAL INSECURITY: ARE THERE ANY APPARENT SIGNS OF INJURIES/BEHAVIORS THAT COULD BE RELATED TO ABUSE/NEGLECT?: NO

## 2023-12-08 SDOH — SOCIAL STABILITY: SOCIAL INSECURITY: DO YOU FEEL UNSAFE GOING BACK TO THE PLACE WHERE YOU ARE LIVING?: NO

## 2023-12-08 SDOH — SOCIAL STABILITY: SOCIAL INSECURITY: ABUSE: ADULT

## 2023-12-08 SDOH — SOCIAL STABILITY: SOCIAL INSECURITY: DOES ANYONE TRY TO KEEP YOU FROM HAVING/CONTACTING OTHER FRIENDS OR DOING THINGS OUTSIDE YOUR HOME?: NO

## 2023-12-08 ASSESSMENT — LIFESTYLE VARIABLES
AUDIT-C TOTAL SCORE: 0
AUDIT-C TOTAL SCORE: 0
HOW OFTEN DO YOU HAVE 6 OR MORE DRINKS ON ONE OCCASION: NEVER
HOW MANY STANDARD DRINKS CONTAINING ALCOHOL DO YOU HAVE ON A TYPICAL DAY: PATIENT DOES NOT DRINK
HOW OFTEN DO YOU HAVE A DRINK CONTAINING ALCOHOL: NEVER
SKIP TO QUESTIONS 9-10: 1

## 2023-12-08 ASSESSMENT — ENCOUNTER SYMPTOMS
GASTROINTESTINAL NEGATIVE: 1
RESPIRATORY NEGATIVE: 1
EYES NEGATIVE: 1
CONSTITUTIONAL NEGATIVE: 1
JOINT SWELLING: 1
CARDIOVASCULAR NEGATIVE: 1

## 2023-12-08 ASSESSMENT — COGNITIVE AND FUNCTIONAL STATUS - GENERAL
MOVING FROM LYING ON BACK TO SITTING ON SIDE OF FLAT BED WITH BEDRAILS: A LITTLE
DRESSING REGULAR LOWER BODY CLOTHING: A LITTLE
WALKING IN HOSPITAL ROOM: A LOT
HELP NEEDED FOR BATHING: A LITTLE
MOBILITY SCORE: 14
MOVING FROM LYING ON BACK TO SITTING ON SIDE OF FLAT BED WITH BEDRAILS: A LITTLE
STANDING UP FROM CHAIR USING ARMS: A LOT
DAILY ACTIVITIY SCORE: 21
WALKING IN HOSPITAL ROOM: A LOT
TURNING FROM BACK TO SIDE WHILE IN FLAT BAD: A LITTLE
DRESSING REGULAR LOWER BODY CLOTHING: A LITTLE
TURNING FROM BACK TO SIDE WHILE IN FLAT BAD: A LITTLE
PERSONAL GROOMING: A LITTLE
PERSONAL GROOMING: A LITTLE
CLIMB 3 TO 5 STEPS WITH RAILING: A LOT
DAILY ACTIVITIY SCORE: 21
MOBILITY SCORE: 14
MOVING TO AND FROM BED TO CHAIR: A LOT
MOVING TO AND FROM BED TO CHAIR: A LOT
STANDING UP FROM CHAIR USING ARMS: A LOT
PATIENT BASELINE BEDBOUND: UNABLE TO ASSESS AT THIS TIME
CLIMB 3 TO 5 STEPS WITH RAILING: A LOT
HELP NEEDED FOR BATHING: A LITTLE

## 2023-12-08 ASSESSMENT — PAIN SCALES - GENERAL
PAINLEVEL_OUTOF10: 0 - NO PAIN
PAINLEVEL_OUTOF10: 5 - MODERATE PAIN
PAINLEVEL_OUTOF10: 0 - NO PAIN

## 2023-12-08 ASSESSMENT — ACTIVITIES OF DAILY LIVING (ADL)
FEEDING YOURSELF: INDEPENDENT
GROOMING: INDEPENDENT
HEARING - RIGHT EAR: FUNCTIONAL
LACK_OF_TRANSPORTATION: NO
DRESSING YOURSELF: INDEPENDENT
PATIENT'S MEMORY ADEQUATE TO SAFELY COMPLETE DAILY ACTIVITIES?: YES
ASSISTIVE_DEVICE: WALKER;WHEELCHAIR
JUDGMENT_ADEQUATE_SAFELY_COMPLETE_DAILY_ACTIVITIES: YES
TOILETING: INDEPENDENT
HEARING - LEFT EAR: FUNCTIONAL
LACK_OF_TRANSPORTATION: NO
WALKS IN HOME: NEEDS ASSISTANCE
BATHING: INDEPENDENT
ADEQUATE_TO_COMPLETE_ADL: YES

## 2023-12-08 ASSESSMENT — PAIN DESCRIPTION - ORIENTATION: ORIENTATION: RIGHT

## 2023-12-08 ASSESSMENT — PAIN - FUNCTIONAL ASSESSMENT
PAIN_FUNCTIONAL_ASSESSMENT: 0-10
PAIN_FUNCTIONAL_ASSESSMENT: 0-10

## 2023-12-08 ASSESSMENT — PATIENT HEALTH QUESTIONNAIRE - PHQ9
1. LITTLE INTEREST OR PLEASURE IN DOING THINGS: NOT AT ALL
SUM OF ALL RESPONSES TO PHQ9 QUESTIONS 1 & 2: 0
2. FEELING DOWN, DEPRESSED OR HOPELESS: NOT AT ALL

## 2023-12-08 ASSESSMENT — COLUMBIA-SUICIDE SEVERITY RATING SCALE - C-SSRS
6. HAVE YOU EVER DONE ANYTHING, STARTED TO DO ANYTHING, OR PREPARED TO DO ANYTHING TO END YOUR LIFE?: NO
2. HAVE YOU ACTUALLY HAD ANY THOUGHTS OF KILLING YOURSELF?: NO
1. IN THE PAST MONTH, HAVE YOU WISHED YOU WERE DEAD OR WISHED YOU COULD GO TO SLEEP AND NOT WAKE UP?: NO

## 2023-12-08 NOTE — LETTER
December 11, 2023     Patient: Shon Boyd .   YOB: 1955   Date of Visit: 12/8/2023       To Whom It May Concern:    It is my medical opinion that Shon Boyd should remain out of work until 12/15/23 .    If you have any questions or concerns, please don't hesitate to call.         Sincerely,        No name on file    CC: No Recipients

## 2023-12-08 NOTE — LETTER
December 11, 2023     Patient: Shon Boyd .   YOB: 1955   Date of Visit: 12/8/2023       To Whom It May Concern:    Shon Boyd was seen in my clinic on 12/8/2023 at . Please excuse Shon for his absence from work on this day to make the appointment.    If you have any questions or concerns, please don't hesitate to call.         Sincerely,         No name on file        CC:   No Recipients

## 2023-12-08 NOTE — CONSULTS
Inpatient consult to Podiatry  Consult performed by: Deanna Stringer DPM  Consult ordered by: Long Suarez DO          Reason For Consult  Right foot/ankle pain/swelling/erythema    History Of Present Illness  Shon Boyd Jr. is a 68 y.o. male presenting with right foot pain and swelling.  Has neuropathy to B/L feet.  Lives in an ASL.  Wears braces to B/L feet but does not ambulate.  Uses wheelchair.  NO overt injury that patient remembers (not a good historian).       Past Medical History  He has a past medical history of BPH (benign prostatic hyperplasia), Chronic dislocation of left shoulder, COPD (chronic obstructive pulmonary disease) (CMS/HCC), Diabetes mellitus (CMS/[facility]), Gastric ulcer, GERD (gastroesophageal reflux disease), Hard of hearing, HLD (hyperlipidemia), Insomnia, ANGLE (obstructive sleep apnea), and Seizure (CMS/[facility]).    Surgical History  He has a past surgical history that includes CT guided percutaneous biopsy lung (08/08/2012); Esophagogastroduodenoscopy; Shoulder surgery; Gastric bypass; Total hip arthroplasty; and Bilateral VATS ablation.     Social History  He reports that he has never smoked. He has never used smokeless tobacco. No history on file for alcohol use and drug use.    Family History  Family History   Problem Relation Name Age of Onset    Stroke Mother      Aneurysm Father          Allergies  Patient has no known allergies.    Review of Systems   +for above     Physical Exam    Constitutional:       Appearance: He is obese.   HENT:      Head: Normocephalic and atraumatic.      Nose: Nose normal.      Mouth/Throat:      Mouth: Mucous membranes are moist.   Eyes:      Extraocular Movements: Extraocular movements intact.      Pupils: Pupils are equal, round, and reactive to light.   Cardiovascular:      Rate and Rhythm: Normal rate.      Heart sounds: Murmur heard.   Pulmonary:      Effort: Pulmonary effort is normal.      Breath sounds: Normal breath sounds.    Abdominal:      General: Bowel sounds are normal.      Palpations: Abdomen is soft.      Tenderness: There is no abdominal tenderness. There is no guarding.      Hernia: No hernia is present.   Musculoskeletal:      Cervical back: Normal range of motion.   Feet:      Comments: erythema and edema to right foot/ankle with some tenderness to proximal ankle;  palpable pedal pulses;  excoriations/scratches to right lateral ankle foot.  No active drainage.    Neurological:      Mental Status: He is alert.      Comments:  hard of hearing, dysarthric      Last Recorded Vitals  Blood pressure 150/85, pulse 62, temperature 36.6 °C (97.9 °F), resp. rate 17, height 1.829 m (6'), weight 120 kg (265 lb), SpO2 99 %.    Relevant Results  CT ankle right wo IV contrast    Result Date: 12/8/2023  Interpreted By:  Brian Uribe, STUDY: CT of the  right ankle without intravenous contrast dated  12/8/2023.   INDICATION: Signs/Symptoms:right ankle pain/swelling;  questionable acute fracture to talus   COMPARISON: None.   ACCESSION NUMBER(S): [id number]   ORDERING CLINICIAN: TOREY VALENCIA   TECHNIQUE: Axial CT of the  right ankle  was performed  without intravenous contrast.  Sagittal and coronal 2 dimensional reformats were obtained.   FINDINGS: OSSEOUS STRUCTURES AND JOINTS:   The bones are demineralized. No acute fracture or dislocation is evident. Multiple corticated bodies are seen adjacent to the medial malleolus and medial talus which may be sequelae of prior avulsion injury/injuries. Some of the bodies may be intracapsular bodies related to degenerative change. There is a corticated body at the periphery the lateral malleolus in the region of the fibular attachment of the superior peroneal retinaculum which may relate to prior retinacular avulsion injury. There is severe subtalar degenerative change with multiple intracapsular osteochondral bodies, anteriorly and posteriorly. Can not exclude some of these bodies  representing sequelae of avulsion fractures. There is osseous irregularity to the anterior process of the calcaneus with adjacent corticated body likely related to prior avulsion injury. There is a corticated body at the dorsal distal talus likely related to prior dorsal talonavicular joint capsule avulsion fracture. There is calcaneal enthesophyte formation. Mild polyarticular degenerative changes seen in the midfoot. There are findings most compatible with an os inter metatarsus. Deformity is seen of the base of the 1st metatarsal likely related to prior fracture. Deformity is seen of the proximal metaphysis the 2nd through 5th metatarsal as visualized compatible with prior fractures. No joint effusion is evident.   MUSCLES AND TENDONS:   Generalized atrophy is seen in the musculature.   ASSOCIATED SOFT TISSUES:   Reticular increased density is seen in the subcutaneous tissues of the visualized lower extremity with some confluence in the leg and over the malleoli and on the dorsum of the foot. No focal collection is evident as seen on this noncontrast CT.       1.  Findings compatible with site of prior fracturing of the visualized foot and ankle without acute osseous injury evident. 2. Reticular increased density in the subcutaneous tissues with regions of confluence. This may represent cellulitis and/or lymphedema.   MACRO: none   Signed by: Brian Uribe 12/8/2023 3:30 PM Dictation workstation:   [id number]    XR tibia fibula right 2 views    Result Date: 12/8/2023  Interpreted By:  Finkelstein, Evan, STUDY: XR TIBIA FIBULA RIGHT 2 VIEWS; XR FOOT RIGHT 3+ VIEWS;  12/8/2023 4:02 am three views right foot. 4 images of the right tibia/fibula   INDICATION: Signs/Symptoms:leg swelling; Signs/Symptoms:foot swelling.   COMPARISON: None.   ACCESSION NUMBER(S): [id number]; [id number]   ORDERING CLINICIAN: EDISON ARELLANO   FINDINGS: Prominent os trigonum. Retrocalcaneal enthesophyte and plantar calcaneal  spur. Cortical irregularity of the talar head. Irregularity of the 5th metatarsal diaphysis favored to represent a remote injury. Dorsal midfoot soft tissue swelling. Age-indeterminate avulsion injury involving the medial malleolus. There is ankle soft tissue swelling.       Cortical irregularity of the talar head concerning for fracture. Correlate with point tenderness. Irregularity of the 5th metatarsal diaphysis favored to represent a remote injury. Correlate with point tenderness. Age-indeterminate avulsion injury involving the medial malleolus. There is ankle soft tissue swelling.     MACRO: None.   Signed by: Evan Finkelstein 12/8/2023 4:10 AM Dictation workstation:   [id number]    XR foot right 3+ views    Result Date: 12/8/2023  Interpreted By:  Finkelstein, Evan, STUDY: XR TIBIA FIBULA RIGHT 2 VIEWS; XR FOOT RIGHT 3+ VIEWS;  12/8/2023 4:02 am three views right foot. 4 images of the right tibia/fibula   INDICATION: Signs/Symptoms:leg swelling; Signs/Symptoms:foot swelling.   COMPARISON: None.   ACCESSION NUMBER(S): [id number]; [id number]   ORDERING CLINICIAN: EDISON ARELLANO   FINDINGS: Prominent os trigonum. Retrocalcaneal enthesophyte and plantar calcaneal spur. Cortical irregularity of the talar head. Irregularity of the 5th metatarsal diaphysis favored to represent a remote injury. Dorsal midfoot soft tissue swelling. Age-indeterminate avulsion injury involving the medial malleolus. There is ankle soft tissue swelling.       Cortical irregularity of the talar head concerning for fracture. Correlate with point tenderness. Irregularity of the 5th metatarsal diaphysis favored to represent a remote injury. Correlate with point tenderness. Age-indeterminate avulsion injury involving the medial malleolus. There is ankle soft tissue swelling.     MACRO: None.   Signed by: Evan Finkelstein 12/8/2023 4:10 AM Dictation workstation:   [id number]   Results for orders placed or performed during the  hospital encounter of 12/08/23 (from the past 24 hour(s))   Basic metabolic panel   Result Value Ref Range    Glucose 105 (H) 74 - 99 mg/dL    Sodium 136 136 - 145 mmol/L    Potassium 5.0 3.5 - 5.3 mmol/L    Chloride 103 98 - 107 mmol/L    Bicarbonate 26 21 - 32 mmol/L    Anion Gap 12 10 - 20 mmol/L    Urea Nitrogen 23 6 - 23 mg/dL    Creatinine 0.78 0.50 - 1.30 mg/dL    eGFR >90 >60 mL/min/1.73m*2    Calcium 8.6 8.6 - 10.3 mg/dL   CBC and Auto Differential   Result Value Ref Range    WBC 7.6 4.4 - 11.3 x10*3/uL    nRBC 0.0 0.0 - 0.0 /100 WBCs    RBC 4.70 4.50 - 5.90 x10*6/uL    Hemoglobin 13.8 13.5 - 17.5 g/dL    Hematocrit 43.9 41.0 - 52.0 %    MCV 93 80 - 100 fL    MCH 29.4 26.0 - 34.0 pg    MCHC 31.4 (L) 32.0 - 36.0 g/dL    RDW 14.0 11.5 - 14.5 %    Platelets 262 150 - 450 x10*3/uL    Neutrophils % 69.7 40.0 - 80.0 %    Immature Granulocytes %, Automated 0.3 0.0 - 0.9 %    Lymphocytes % 19.6 13.0 - 44.0 %    Monocytes % 8.2 2.0 - 10.0 %    Eosinophils % 1.7 0.0 - 6.0 %    Basophils % 0.5 0.0 - 2.0 %    Neutrophils Absolute 5.27 1.20 - 7.70 x10*3/uL    Immature Granulocytes Absolute, Automated 0.02 0.00 - 0.70 x10*3/uL    Lymphocytes Absolute 1.48 1.20 - 4.80 x10*3/uL    Monocytes Absolute 0.62 0.10 - 1.00 x10*3/uL    Eosinophils Absolute 0.13 0.00 - 0.70 x10*3/uL    Basophils Absolute 0.04 0.00 - 0.10 x10*3/uL   Lactate   Result Value Ref Range    Lactate 1.1 0.4 - 2.0 mmol/L   Blood Culture    Specimen: Peripheral Venipuncture; Blood culture   Result Value Ref Range    Blood Culture Loaded on Instrument - Culture in progress    Blood Culture    Specimen: Peripheral Venipuncture; Blood culture   Result Value Ref Range    Blood Culture Loaded on Instrument - Culture in progress    POCT GLUCOSE   Result Value Ref Range    POCT Glucose 107 (H) 74 - 99 mg/dL   POCT GLUCOSE   Result Value Ref Range    POCT Glucose 165 (H) 74 - 99 mg/dL   No results found for the last 90 days.       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  IV antibiotics per primary service  Keep right foot/leg elevated  Will follow-up in AM    I spent 40 minutes in the professional and overall care of this patient.

## 2023-12-08 NOTE — ED PROVIDER NOTES
HPI   Chief Complaint   Patient presents with    Ankle Pain     Pts right foot/ankle is swollen, red and warm to touch. Pt denies any injury or trauma       68-year-old male from home for chief complaint of right ankle pain and swelling.  He noticed intense pain and swelling tonight.  He is a type II diabetic and has a history of neuropathy.  He was apparently hitting his foot on the door and did not know.  He looked down and saw some scratches on it.  He states that the lower leg is extremely warm and red to him.  It is very difficult for him to walk.  He denies any trauma to the foot.    10 point review of systems reviewed and is negative unless otherwise stated                          Felicia Coma Scale Score: 15                  Patient History   No past medical history on file.  Past Surgical History:   Procedure Laterality Date    CT GUIDED PERCUTANEOUS BIOPSY LUNG  8/8/2012    CT GUIDED PERCUTANEOUS BIOPSY LUNG 8/8/2012 [facility] LEGAC[facility]     Family History   Problem Relation Name Age of Onset    Stroke Mother      Aneurysm Father       Social History     Tobacco Use    Smoking status: Not on file    Smokeless tobacco: Not on file   Substance Use Topics    Alcohol use: Not on file    Drug use: Not on file       Physical Exam   ED Triage Vitals [12/08/23 0312]   Temp Heart Rate Resp BP   36.5 °C (97.7 °F) 65 18 147/79      SpO2 Temp src Heart Rate Source Patient Position   -- -- -- --      BP Location FiO2 (%)     -- --       Physical Exam  Vitals and nursing note reviewed.   Constitutional:       Appearance: Normal appearance.   HENT:      Head: Normocephalic and atraumatic.      Nose: Nose normal.      Mouth/Throat:      Mouth: Mucous membranes are moist.   Eyes:      Extraocular Movements: Extraocular movements intact.      Pupils: Pupils are equal, round, and reactive to light.   Cardiovascular:      Rate and Rhythm: Normal rate and regular rhythm.   Pulmonary:      Effort: Pulmonary effort is normal.       Breath sounds: Normal breath sounds.   Abdominal:      General: Abdomen is flat.      Palpations: Abdomen is soft.   Musculoskeletal:         General: Swelling present. Normal range of motion.      Cervical back: Normal range of motion.      Comments: Right lower extremity from the shin down is cellulitic, foot is extremely swollen pulses are 1+, there are abrasions present on the lateral foot.   Skin:     General: Skin is dry.      Capillary Refill: Capillary refill takes less than 2 seconds.      Findings: Erythema present.   Neurological:      General: No focal deficit present.      Mental Status: He is alert.   Psychiatric:         Mood and Affect: Mood normal.         ED Course & MDM   Diagnoses as of 12/08/23 0525   Cellulitis   Cellulitis of foot       Medical Decision Making  Medical Decision Making: Patient was worked up lab work is very reassuring.  X-rays show a talar fracture.  Zosyn was started and the patient will be hospitalized for further management treatment at this time as he cannot ambulate on examination.  [unfilled]     EKG interpreted by myself (ED attending physician): N/A    Differential Diagnoses Considered: Vascular insufficiency cellulitis    Chronic Medical Conditions Significantly Affecting Care: Diabetes hypertension peripheral neuropathy    External Records Reviewed: I reviewed recent and relevant outside records including: Previous labs    Social Determinants of Health Significantly Affecting Care: Lives alone    Diagnostic testing considered: Ultrasound leg    Candi Velasco D.O.  Emergency Medicine          Procedure  Procedures     Candi Velasco,   12/08/23 [id number]

## 2023-12-08 NOTE — PROGRESS NOTES
12/08/23 1434   Discharge Planning   Living Arrangements Other (Comment)  (from a Group Home)   Support Systems Friends/neighbors;Other (Comment)  (caregiver at group home)   Assistance Needed A&Ox2, uses a walker or wheelchair; needs assistance for ADLs; does not drive, staff at group home transports   Type of Residence Group home   Home or Post Acute Services None   Patient expects to be discharged to: return to Group Home   Does the patient need discharge transport arranged? No        12/11/23 1051   Discharge Planning   Living Arrangements Other (Comment)  (from a Group home)   Support Systems Friends/neighbors;Other (Comment)  (caregiver at group home)   Assistance Needed A&Ox2, walker or WC for ambulation, needs assistance with ADLs   Type of Residence Group home   Home or Post Acute Services In home services   Type of Home Care Services Home nursing visits;Home OT;Home PT   Patient expects to be discharged to: return to Group Home with new [facility]   Does the patient need discharge transport arranged? Yes   RoundTrip coordination needed? Yes   Has discharge transport been arranged? No   12/11/2023 1053: Spoke to manager of [facility] and she is agreeable for HHC for patient and preferenced  HHC. MD aware to send to referral.
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
## 2023-12-09 LAB
ALBUMIN SERPL BCP-MCNC: 3.5 G/DL (ref 3.4–5)
ANION GAP SERPL CALC-SCNC: 13 MMOL/L (ref 10–20)
BUN SERPL-MCNC: 19 MG/DL (ref 6–23)
CALCIUM SERPL-MCNC: 8.3 MG/DL (ref 8.6–10.3)
CHLORIDE SERPL-SCNC: 106 MMOL/L (ref 98–107)
CO2 SERPL-SCNC: 23 MMOL/L (ref 21–32)
CREAT SERPL-MCNC: 0.79 MG/DL (ref 0.5–1.3)
ERYTHROCYTE [DISTWIDTH] IN BLOOD BY AUTOMATED COUNT: 14.3 % (ref 11.5–14.5)
GFR SERPL CREATININE-BSD FRML MDRD: >90 ML/MIN/1.73M*2
GLUCOSE BLD MANUAL STRIP-MCNC: 103 MG/DL (ref 74–99)
GLUCOSE BLD MANUAL STRIP-MCNC: 167 MG/DL (ref 74–99)
GLUCOSE BLD MANUAL STRIP-MCNC: 179 MG/DL (ref 74–99)
GLUCOSE BLD MANUAL STRIP-MCNC: 86 MG/DL (ref 74–99)
GLUCOSE SERPL-MCNC: 103 MG/DL (ref 74–99)
HCT VFR BLD AUTO: 41.4 % (ref 41–52)
HGB BLD-MCNC: 12.9 G/DL (ref 13.5–17.5)
MCH RBC QN AUTO: 29.3 PG (ref 26–34)
MCHC RBC AUTO-ENTMCNC: 31.2 G/DL (ref 32–36)
MCV RBC AUTO: 94 FL (ref 80–100)
NRBC BLD-RTO: 0 /100 WBCS (ref 0–0)
PHOSPHATE SERPL-MCNC: 4 MG/DL (ref 2.5–4.9)
PLATELET # BLD AUTO: 252 X10*3/UL (ref 150–450)
POTASSIUM SERPL-SCNC: 4.5 MMOL/L (ref 3.5–5.3)
RBC # BLD AUTO: 4.4 X10*6/UL (ref 4.5–5.9)
SODIUM SERPL-SCNC: 137 MMOL/L (ref 136–145)
WBC # BLD AUTO: 4.6 X10*3/UL (ref 4.4–11.3)

## 2023-12-09 PROCEDURE — 36415 COLL VENOUS BLD VENIPUNCTURE: CPT | Performed by: FAMILY MEDICINE

## 2023-12-09 PROCEDURE — 80069 RENAL FUNCTION PANEL: CPT | Performed by: FAMILY MEDICINE

## 2023-12-09 PROCEDURE — 85027 COMPLETE CBC AUTOMATED: CPT | Performed by: FAMILY MEDICINE

## 2023-12-09 PROCEDURE — G0378 HOSPITAL OBSERVATION PER HR: HCPCS

## 2023-12-09 PROCEDURE — 2500000004 HC RX 250 GENERAL PHARMACY W/ HCPCS (ALT 636 FOR OP/ED): Performed by: FAMILY MEDICINE

## 2023-12-09 PROCEDURE — 99232 SBSQ HOSP IP/OBS MODERATE 35: CPT | Performed by: FAMILY MEDICINE

## 2023-12-09 PROCEDURE — 96372 THER/PROPH/DIAG INJ SC/IM: CPT | Performed by: FAMILY MEDICINE

## 2023-12-09 PROCEDURE — 2500000001 HC RX 250 WO HCPCS SELF ADMINISTERED DRUGS (ALT 637 FOR MEDICARE OP): Performed by: FAMILY MEDICINE

## 2023-12-09 PROCEDURE — 82947 ASSAY GLUCOSE BLOOD QUANT: CPT

## 2023-12-09 PROCEDURE — 2500000004 HC RX 250 GENERAL PHARMACY W/ HCPCS (ALT 636 FOR OP/ED): Mod: JZ | Performed by: INTERNAL MEDICINE

## 2023-12-09 PROCEDURE — 2500000002 HC RX 250 W HCPCS SELF ADMINISTERED DRUGS (ALT 637 FOR MEDICARE OP, ALT 636 FOR OP/ED): Performed by: FAMILY MEDICINE

## 2023-12-09 RX ORDER — POLYETHYLENE GLYCOL 3350 17 G/17G
17 POWDER, FOR SOLUTION ORAL DAILY PRN
Status: DISCONTINUED | OUTPATIENT
Start: 2023-12-09 | End: 2023-12-09 | Stop reason: SDUPTHER

## 2023-12-09 RX ORDER — CEFAZOLIN SODIUM 2 G/100ML
2 INJECTION, SOLUTION INTRAVENOUS EVERY 8 HOURS
Status: DISCONTINUED | OUTPATIENT
Start: 2023-12-09 | End: 2023-12-11 | Stop reason: HOSPADM

## 2023-12-09 RX ORDER — ONDANSETRON HYDROCHLORIDE 2 MG/ML
4 INJECTION, SOLUTION INTRAVENOUS EVERY 8 HOURS PRN
Status: DISCONTINUED | OUTPATIENT
Start: 2023-12-09 | End: 2023-12-11 | Stop reason: HOSPADM

## 2023-12-09 RX ORDER — ACETAMINOPHEN 325 MG/1
650 TABLET ORAL EVERY 4 HOURS PRN
Status: DISCONTINUED | OUTPATIENT
Start: 2023-12-09 | End: 2023-12-09 | Stop reason: SDUPTHER

## 2023-12-09 RX ORDER — ONDANSETRON 4 MG/1
4 TABLET, FILM COATED ORAL EVERY 8 HOURS PRN
Status: DISCONTINUED | OUTPATIENT
Start: 2023-12-09 | End: 2023-12-11 | Stop reason: HOSPADM

## 2023-12-09 RX ORDER — CEFAZOLIN SODIUM 2 G/50ML
2 SOLUTION INTRAVENOUS EVERY 8 HOURS
Status: DISCONTINUED | OUTPATIENT
Start: 2023-12-09 | End: 2023-12-09

## 2023-12-09 RX ORDER — ACETAMINOPHEN 160 MG/5ML
650 SOLUTION ORAL EVERY 4 HOURS PRN
Status: DISCONTINUED | OUTPATIENT
Start: 2023-12-09 | End: 2023-12-09 | Stop reason: SDUPTHER

## 2023-12-09 RX ORDER — ENOXAPARIN SODIUM 100 MG/ML
40 INJECTION SUBCUTANEOUS EVERY 24 HOURS
Status: DISCONTINUED | OUTPATIENT
Start: 2023-12-09 | End: 2023-12-09 | Stop reason: SDUPTHER

## 2023-12-09 RX ORDER — ACETAMINOPHEN 650 MG/1
650 SUPPOSITORY RECTAL EVERY 4 HOURS PRN
Status: DISCONTINUED | OUTPATIENT
Start: 2023-12-09 | End: 2023-12-09 | Stop reason: SDUPTHER

## 2023-12-09 RX ADMIN — PHENYTOIN SODIUM 200 MG: 100 CAPSULE ORAL at 21:02

## 2023-12-09 RX ADMIN — CEFAZOLIN SODIUM 2 G: 2 INJECTION, SOLUTION INTRAVENOUS at 13:36

## 2023-12-09 RX ADMIN — VILAZODONE HYDROCHLORIDE 40 MG: 20 TABLET ORAL at 11:23

## 2023-12-09 RX ADMIN — LISINOPRIL 5 MG: 5 TABLET ORAL at 11:23

## 2023-12-09 RX ADMIN — TRAZODONE HYDROCHLORIDE 50 MG: 50 TABLET ORAL at 11:23

## 2023-12-09 RX ADMIN — Medication 1 TABLET: at 08:32

## 2023-12-09 RX ADMIN — OXYBUTYNIN CHLORIDE 5 MG: 5 TABLET ORAL at 21:02

## 2023-12-09 RX ADMIN — OXYBUTYNIN CHLORIDE 5 MG: 5 TABLET ORAL at 15:01

## 2023-12-09 RX ADMIN — Medication 1 TABLET: at 21:02

## 2023-12-09 RX ADMIN — PANTOPRAZOLE SODIUM 40 MG: 40 TABLET, DELAYED RELEASE ORAL at 11:23

## 2023-12-09 RX ADMIN — NYSTATIN: 100000 CREAM TOPICAL at 08:32

## 2023-12-09 RX ADMIN — POLYETHYLENE GLYCOL 3350 17 G: 17 POWDER, FOR SOLUTION ORAL at 08:32

## 2023-12-09 RX ADMIN — ENOXAPARIN SODIUM 40 MG: 40 INJECTION SUBCUTANEOUS at 11:23

## 2023-12-09 RX ADMIN — PHENYTOIN SODIUM 200 MG: 100 CAPSULE ORAL at 08:31

## 2023-12-09 RX ADMIN — PIPERACILLIN SODIUM AND TAZOBACTAM SODIUM 3.38 G: 3; .375 INJECTION, SOLUTION INTRAVENOUS at 04:21

## 2023-12-09 RX ADMIN — ASPIRIN 81 MG: 81 TABLET, COATED ORAL at 08:31

## 2023-12-09 RX ADMIN — BUSPIRONE HYDROCHLORIDE 10 MG: 10 TABLET ORAL at 11:23

## 2023-12-09 RX ADMIN — FINASTERIDE 5 MG: 5 TABLET, FILM COATED ORAL at 11:23

## 2023-12-09 RX ADMIN — INSULIN LISPRO 2 UNITS: 100 INJECTION, SOLUTION INTRAVENOUS; SUBCUTANEOUS at 12:07

## 2023-12-09 RX ADMIN — FLUTICASONE PROPIONATE 2 SPRAY: 50 SPRAY, METERED NASAL at 08:32

## 2023-12-09 RX ADMIN — LORATADINE 10 MG: 10 TABLET ORAL at 08:32

## 2023-12-09 RX ADMIN — NYSTATIN: 100000 CREAM TOPICAL at 21:03

## 2023-12-09 RX ADMIN — CEFAZOLIN SODIUM 2 G: 2 INJECTION, SOLUTION INTRAVENOUS at 21:02

## 2023-12-09 RX ADMIN — BUSPIRONE HYDROCHLORIDE 10 MG: 10 TABLET ORAL at 03:06

## 2023-12-09 RX ADMIN — Medication 6 MG: at 21:12

## 2023-12-09 RX ADMIN — BUSPIRONE HYDROCHLORIDE 10 MG: 10 TABLET ORAL at 21:04

## 2023-12-09 RX ADMIN — PHENYTOIN SODIUM 200 MG: 100 CAPSULE ORAL at 15:01

## 2023-12-09 RX ADMIN — OXYBUTYNIN CHLORIDE 5 MG: 5 TABLET ORAL at 08:32

## 2023-12-09 ASSESSMENT — COGNITIVE AND FUNCTIONAL STATUS - GENERAL
WALKING IN HOSPITAL ROOM: A LOT
PERSONAL GROOMING: A LITTLE
MOVING FROM LYING ON BACK TO SITTING ON SIDE OF FLAT BED WITH BEDRAILS: A LITTLE
DAILY ACTIVITIY SCORE: 21
CLIMB 3 TO 5 STEPS WITH RAILING: A LOT
MOBILITY SCORE: 14
TURNING FROM BACK TO SIDE WHILE IN FLAT BAD: A LITTLE
HELP NEEDED FOR BATHING: A LITTLE
STANDING UP FROM CHAIR USING ARMS: A LOT
MOVING TO AND FROM BED TO CHAIR: A LOT
DRESSING REGULAR LOWER BODY CLOTHING: A LITTLE

## 2023-12-09 ASSESSMENT — PAIN SCALES - GENERAL: PAINLEVEL_OUTOF10: 0 - NO PAIN

## 2023-12-09 ASSESSMENT — PAIN - FUNCTIONAL ASSESSMENT: PAIN_FUNCTIONAL_ASSESSMENT: 0-10

## 2023-12-09 NOTE — PROGRESS NOTES
Shon Boyd Jr. is a 68 y.o. male on day 0 of admission presenting with Cellulitis.    Subjective   No acute events overnight       Objective     Physical Exam  Last Recorded Vitals  Blood pressure 138/80, pulse 62, temperature 36.2 °C (97.2 °F), resp. rate 18, height 1.829 m (6'), weight 120 kg (265 lb), SpO2 97 %.    Constitutional:       Appearance: He is obese.   HENT:      Head: Normocephalic and atraumatic.      Nose: Nose normal.      Mouth/Throat:      Mouth: Mucous membranes are moist.   Eyes:      Extraocular Movements: Extraocular movements intact.      Pupils: Pupils are equal, round, and reactive to light.   Cardiovascular:      Rate and Rhythm: Normal rate.      Heart sounds: Murmur heard.   Pulmonary:      Effort: Pulmonary effort is normal.      Breath sounds: Normal breath sounds.   Abdominal:      General: Bowel sounds are normal.      Palpations: Abdomen is soft.      Tenderness: There is no abdominal tenderness. There is no guarding.      Hernia: No improving is present.   Musculoskeletal:      Cervical back: Normal range of motion.   Feet:      Comments: right ankle is swollen, erythema improving  Neurological:      Mental Status: He is alert.      Comments:  hard of hearing, dysarthric     Intake/Output last 3 Shifts:  I/O last 3 completed shifts:  In: 200 (1.7 mL/kg) [IV Piggyback:200]  Out: 1261 (10.5 mL/kg) [Urine:1260 (0.3 mL/kg/hr); Stool:1]  Weight: 120.2 kg         Assessment/Plan   Principal Problem:    Cellulitis  Active Problems:    Cellulitis of foot    Shon Boyd Jr. is a 68 y.o. male with medical history of type 2 diabetes mellitus COPD, disability requiring a wheelchair/walker and caregiver for balance who is hard of hearing was brought into the emergency room by his caregiver due to swelling of his right ankle     Right ankle swelling with erythema  X-ray reveals cortical irregularity of the talar head concerning for fracture  CT ruled out acute fracture  Podiatry  following  Continue Yulissasyn  Advised to keep leg elevated  Monitor for improvement  Plan to consult PT/OT after podiatry evaluation     COPD  Not on home O2  Continue DuoNebs as needed     Type 2 diabetes mellitus  Continue insulin sliding scale  Diabetic diet  Continue Accu-Cheks  Hemoglobin A1c pending     Hypertension  Continue medications monitor vitals     Insomnia  Continue home medication     Seizure disorder  Continue home medication     DVT prophylaxis  Lovenox and SCDs     Code status  DNR as discussed with patient    Long Suarez DO

## 2023-12-09 NOTE — PROGRESS NOTES
Shon Boyd Jr. is a 68 y.o. male on day 0 of admission presenting with Cellulitis.    Subjective   Has pain when he puts his right foot down, but otherwise feels like it may be improved.      Meds:  Scheduled medications  aspirin, 81 mg, oral, Daily  busPIRone, 10 mg, oral, q8h  calcium carbonate-vitamin D3, 1 tablet, oral, BID  ceFAZolin, 2 g, intravenous, q8h  enoxaparin, 40 mg, subcutaneous, q24h  finasteride, 5 mg, oral, q24h  fluticasone, 2 spray, Each Nostril, Daily  insulin lispro, 0-10 Units, subcutaneous, TID with meals  lisinopril, 5 mg, oral, q24h  loratadine, 10 mg, oral, Daily  nystatin, , Topical, BID  oxybutynin, 5 mg, oral, TID  pantoprazole, 40 mg, oral, q24h  phenytoin ER, 200 mg, oral, TID  polyethylene glycol, 17 g, oral, Daily  traZODone, 50 mg, oral, q24h  vilazodone, 40 mg, oral, q24h      Continuous medications     PRN medications  PRN medications: acetaminophen **OR** acetaminophen **OR** acetaminophen, dextrose 10 % in water (D10W), dextrose, glucagon, ibuprofen, melatonin, ondansetron **OR** ondansetron        Physical Exam     Constitutional:       Appearance: He is obese.   HENT:      Head: Normocephalic and atraumatic.      Nose: Nose normal.      Mouth/Throat:      Mouth: Mucous membranes are moist.   Eyes:      Extraocular Movements: Extraocular movements intact.      Pupils: Pupils are equal, round, and reactive to light.   Cardiovascular:      Rate and Rhythm: Normal rate.      Heart sounds: Murmur heard.   Pulmonary:      Effort: Pulmonary effort is normal.      Breath sounds: Normal breath sounds.   Abdominal:      General: Bowel sounds are normal.      Palpations: Abdomen is soft.      Tenderness: There is no abdominal tenderness. There is no guarding.      Hernia: No hernia is present.   Musculoskeletal:      Cervical back: Normal range of motion.   Feet:      Comments: decreased erythema and edema to right foot/ankle with less tenderness to proximal ankle;  palpable pedal  pulses;  excoriations/scratches to right lateral ankle foot.  No active drainage.    Neurological:      Mental Status: He is alert.      Comments:  hard of hearing, dysarthric      Last Recorded Vitals  /80   Pulse 62   Temp 36.2 °C (97.2 °F)   Resp 18   Ht 1.829 m (6')   Wt 120 kg (265 lb)      Relevant Results     CT ankle right wo IV contrast    Result Date: 12/8/2023  Interpreted By:  Brian Uribe, STUDY: CT of the  right ankle without intravenous contrast dated  12/8/2023.   INDICATION: Signs/Symptoms:right ankle pain/swelling;  questionable acute fracture to talus   COMPARISON: None.   ACCESSION NUMBER(S): [id number]   ORDERING CLINICIAN: TOREY VALENCIA   TECHNIQUE: Axial CT of the  right ankle  was performed  without intravenous contrast.  Sagittal and coronal 2 dimensional reformats were obtained.   FINDINGS: OSSEOUS STRUCTURES AND JOINTS:   The bones are demineralized. No acute fracture or dislocation is evident. Multiple corticated bodies are seen adjacent to the medial malleolus and medial talus which may be sequelae of prior avulsion injury/injuries. Some of the bodies may be intracapsular bodies related to degenerative change. There is a corticated body at the periphery the lateral malleolus in the region of the fibular attachment of the superior peroneal retinaculum which may relate to prior retinacular avulsion injury. There is severe subtalar degenerative change with multiple intracapsular osteochondral bodies, anteriorly and posteriorly. Can not exclude some of these bodies representing sequelae of avulsion fractures. There is osseous irregularity to the anterior process of the calcaneus with adjacent corticated body likely related to prior avulsion injury. There is a corticated body at the dorsal distal talus likely related to prior dorsal talonavicular joint capsule avulsion fracture. There is calcaneal enthesophyte formation. Mild polyarticular degenerative changes seen in  the midfoot. There are findings most compatible with an os inter metatarsus. Deformity is seen of the base of the 1st metatarsal likely related to prior fracture. Deformity is seen of the proximal metaphysis the 2nd through 5th metatarsal as visualized compatible with prior fractures. No joint effusion is evident.   MUSCLES AND TENDONS:   Generalized atrophy is seen in the musculature.   ASSOCIATED SOFT TISSUES:   Reticular increased density is seen in the subcutaneous tissues of the visualized lower extremity with some confluence in the leg and over the malleoli and on the dorsum of the foot. No focal collection is evident as seen on this noncontrast CT.       1.  Findings compatible with site of prior fracturing of the visualized foot and ankle without acute osseous injury evident. 2. Reticular increased density in the subcutaneous tissues with regions of confluence. This may represent cellulitis and/or lymphedema.   MACRO: none   Signed by: Brian Uribe 12/8/2023 3:30 PM Dictation workstation:   [id number]    XR tibia fibula right 2 views    Result Date: 12/8/2023  Interpreted By:  Finkelstein, Evan, STUDY: XR TIBIA FIBULA RIGHT 2 VIEWS; XR FOOT RIGHT 3+ VIEWS;  12/8/2023 4:02 am three views right foot. 4 images of the right tibia/fibula   INDICATION: Signs/Symptoms:leg swelling; Signs/Symptoms:foot swelling.   COMPARISON: None.   ACCESSION NUMBER(S): [id number]; [id number]   ORDERING CLINICIAN: EDISON ARELLANO   FINDINGS: Prominent os trigonum. Retrocalcaneal enthesophyte and plantar calcaneal spur. Cortical irregularity of the talar head. Irregularity of the 5th metatarsal diaphysis favored to represent a remote injury. Dorsal midfoot soft tissue swelling. Age-indeterminate avulsion injury involving the medial malleolus. There is ankle soft tissue swelling.       Cortical irregularity of the talar head concerning for fracture. Correlate with point tenderness. Irregularity of the 5th metatarsal  diaphysis favored to represent a remote injury. Correlate with point tenderness. Age-indeterminate avulsion injury involving the medial malleolus. There is ankle soft tissue swelling.     MACRO: None.   Signed by: Evan Finkelstein 12/8/2023 4:10 AM Dictation workstation:   [id number]    XR foot right 3+ views    Result Date: 12/8/2023  Interpreted By:  Finkelstein, Evan, STUDY: XR TIBIA FIBULA RIGHT 2 VIEWS; XR FOOT RIGHT 3+ VIEWS;  12/8/2023 4:02 am three views right foot. 4 images of the right tibia/fibula   INDICATION: Signs/Symptoms:leg swelling; Signs/Symptoms:foot swelling.   COMPARISON: None.   ACCESSION NUMBER(S): [id number]; [id number]   ORDERING CLINICIAN: EDISON ARELLANO   FINDINGS: Prominent os trigonum. Retrocalcaneal enthesophyte and plantar calcaneal spur. Cortical irregularity of the talar head. Irregularity of the 5th metatarsal diaphysis favored to represent a remote injury. Dorsal midfoot soft tissue swelling. Age-indeterminate avulsion injury involving the medial malleolus. There is ankle soft tissue swelling.       Cortical irregularity of the talar head concerning for fracture. Correlate with point tenderness. Irregularity of the 5th metatarsal diaphysis favored to represent a remote injury. Correlate with point tenderness. Age-indeterminate avulsion injury involving the medial malleolus. There is ankle soft tissue swelling.     MACRO: None.   Signed by: Evan Finkelstein 12/8/2023 4:10 AM Dictation workstation:   [id number]   Results for orders placed or performed during the hospital encounter of 12/08/23 (from the past 24 hour(s))   POCT GLUCOSE   Result Value Ref Range    POCT Glucose 165 (H) 74 - 99 mg/dL   POCT GLUCOSE   Result Value Ref Range    POCT Glucose 91 74 - 99 mg/dL   Renal Function Panel   Result Value Ref Range    Glucose 103 (H) 74 - 99 mg/dL    Sodium 137 136 - 145 mmol/L    Potassium 4.5 3.5 - 5.3 mmol/L    Chloride 106 98 - 107 mmol/L    Bicarbonate 23 21 -  32 mmol/L    Anion Gap 13 10 - 20 mmol/L    Urea Nitrogen 19 6 - 23 mg/dL    Creatinine 0.79 0.50 - 1.30 mg/dL    eGFR >90 >60 mL/min/1.73m*2    Calcium 8.3 (L) 8.6 - 10.3 mg/dL    Phosphorus 4.0 2.5 - 4.9 mg/dL    Albumin 3.5 3.4 - 5.0 g/dL   CBC   Result Value Ref Range    WBC 4.6 4.4 - 11.3 x10*3/uL    nRBC 0.0 0.0 - 0.0 /100 WBCs    RBC 4.40 (L) 4.50 - 5.90 x10*6/uL    Hemoglobin 12.9 (L) 13.5 - 17.5 g/dL    Hematocrit 41.4 41.0 - 52.0 %    MCV 94 80 - 100 fL    MCH 29.3 26.0 - 34.0 pg    MCHC 31.2 (L) 32.0 - 36.0 g/dL    RDW 14.3 11.5 - 14.5 %    Platelets 252 150 - 450 x10*3/uL   POCT GLUCOSE   Result Value Ref Range    POCT Glucose 103 (H) 74 - 99 mg/dL   POCT GLUCOSE   Result Value Ref Range    POCT Glucose 179 (H) 74 - 99 mg/dL        Assessment/Plan   Right foot/ankle pain/swelling with cellulitis  CT ruled out acute fracture.  Foot/ankle improved today.  Compression wrap applied.  Keep foot elevated.  IV antibiotics per ID.  Keep right foot/leg elevated  PT/OT eval.    Deanna Stringer, DPRADHA  [phone]

## 2023-12-09 NOTE — CONSULTS
Consults  Referred by PANTERA Suarez MD: Sanket Delacruz DO    Reason For Consult  cellulitis    History Of Present Illness  Shon Boyd Jr. is a 68 y.o. male, hx of .DM, hx of COPD, hx of GERD, hx of BPH, hx of hearing loss, he was admitted for Rt ankle swelling and redness x 1 day following an abrasion on the wheelchair, no fever, no chills, no drainage, the WBC are N, the CT without acute changes, the hx is limited     Past Medical History  He has a past medical history of BPH (benign prostatic hyperplasia), Chronic dislocation of left shoulder, COPD (chronic obstructive pulmonary disease) (CMS/HCC), Diabetes mellitus (CMS/HCC), Gastric ulcer, GERD (gastroesophageal reflux disease), Hard of hearing, HLD (hyperlipidemia), Insomnia, ANGLE (obstructive sleep apnea), and Seizure (CMS/[facility]).    Surgical History  He has a past surgical history that includes CT guided percutaneous biopsy lung (08/08/2012); Esophagogastroduodenoscopy; Shoulder surgery; Gastric bypass; Total hip arthroplasty; and Bilateral VATS ablation.     Social History     Occupational History    Not on file   Tobacco Use    Smoking status: Never    Smokeless tobacco: Never   Substance and Sexual Activity    Alcohol use: Not on file    Drug use: Not on file    Sexual activity: Not on file     Travel History   Travel since 11/09/23    No documented travel since 11/09/23          Family History  Family History   Problem Relation Name Age of Onset    Stroke Mother      Aneurysm Father       Allergies  Patient has no known allergies.     Immunization History   Administered Date(s) Administered    Flu vaccine (IIV4), preservative free *Check age/dose* 10/02/2020    Influenza, Unspecified 09/14/2009, 09/14/2011, 10/18/2012    Influenza, injectable, quadrivalent 09/16/2019    Influenza, seasonal, injectable 10/29/2010, 10/03/2013, 11/14/2014    Moderna SARS-CoV-2 Vaccination 05/02/2021    Pneumococcal polysaccharide vaccine, 23-valent, age 2 years  and older (PNEUMOVAX 23) 09/16/2019    Tdap vaccine, age 7 year and older (BOOSTRIX) 06/21/2014, 05/14/2015     Medications  Home medications:  Medications Prior to Admission   Medication Sig Dispense Refill Last Dose    aspirin 81 mg EC tablet Take 1 tablet (81 mg) by mouth once daily.   12/7/2023    busPIRone (Buspar) 10 mg tablet Take 1 tablet (10 mg) by mouth every 8 hours.   12/7/2023    calcium carbonate-vitamin D3 600 mg-10 mcg (400 unit) tablet Take 1 tablet by mouth 2 times a day.   12/7/2023    cetirizine (ZyrTEC) 10 mg tablet Take 1 tablet (10 mg) by mouth once daily.   12/7/2023    colestipol (Colestid) 1 gram tablet once every 24 hours.   12/7/2023    finasteride (Proscar) 5 mg tablet Take 1 tablet (5 mg) by mouth once every 24 hours.   12/7/2023    fluticasone (Flonase) 50 mcg/actuation nasal spray Administer 2 sprays into each nostril once daily.   12/7/2023    lisinopril 5 mg tablet 1 tablet (5 mg) once every 24 hours.   12/7/2023    melatonin 10 mg capsule Take 1 capsule (10 mg) by mouth once daily at bedtime.       oxybutynin (Ditropan) 5 mg tablet Take 1 tablet (5 mg) by mouth 3 times a day.   12/7/2023    pantoprazole (ProtoNix) 40 mg EC tablet Take 1 tablet (40 mg) by mouth once every 24 hours.   12/7/2023    phenytoin ER (Dilantin) 100 mg capsule Take 2 capsules (200 mg) by mouth 3 times a day.   12/7/2023    traZODone (Desyrel) 50 mg tablet Take 1 tablet (50 mg) by mouth once every 24 hours.   12/7/2023    Viibryd 40 mg tablet Take 1 tablet (40 mg) by mouth once every 24 hours.   12/7/2023     Current medications:  Scheduled medications  aspirin, 81 mg, oral, Daily  busPIRone, 10 mg, oral, q8h  calcium carbonate-vitamin D3, 1 tablet, oral, BID  enoxaparin, 40 mg, subcutaneous, q24h  finasteride, 5 mg, oral, q24h  fluticasone, 2 spray, Each Nostril, Daily  insulin lispro, 0-10 Units, subcutaneous, TID with meals  lisinopril, 5 mg, oral, q24h  loratadine, 10 mg, oral, Daily  nystatin, , Topical,  BID  oxybutynin, 5 mg, oral, TID  pantoprazole, 40 mg, oral, q24h  phenytoin ER, 200 mg, oral, TID  piperacillin-tazobactam, 3.375 g, intravenous, q6h  polyethylene glycol, 17 g, oral, Daily  traZODone, 50 mg, oral, q24h  vilazodone, 40 mg, oral, q24h      Continuous medications     PRN medications  PRN medications: acetaminophen **OR** acetaminophen **OR** acetaminophen, dextrose 10 % in water (D10W), dextrose, glucagon, ibuprofen, melatonin    Review of Systems   Unable to perform ROS: Mental status change        Objective  Range of Vitals (last 24 hours)  Heart Rate:  [62-66]   Temp:  [36.1 °C (97 °F)-36.2 °C (97.2 °F)]   Resp:  [18]   BP: (118-150)/(59-85)   SpO2:  [97 %]   Daily Weight  12/08/23 : 120 kg (265 lb)    Body mass index is 35.94 kg/m².     Physical Exam  Constitutional:       Appearance: Normal appearance.   HENT:      Head: Normocephalic and atraumatic.      Mouth/Throat:      Mouth: Mucous membranes are moist.      Pharynx: Oropharynx is clear.   Eyes:      Pupils: Pupils are equal, round, and reactive to light.   Cardiovascular:      Rate and Rhythm: Normal rate and regular rhythm.      Heart sounds: Normal heart sounds.   Pulmonary:      Effort: Pulmonary effort is normal.      Breath sounds: Normal breath sounds.   Abdominal:      General: Abdomen is flat. Bowel sounds are normal.      Palpations: Abdomen is soft.   Musculoskeletal:      Cervical back: Normal range of motion.      Comments: Rt ankle inversion, lower leg / ankle edema and redness, abrasion on the lateral ankle and leg, no purulence    Neurological:      Mental Status: He is alert.          Relevant Results  Outside Hospital Results  reviewed  Labs  Results from last 72 hours   Lab Units 12/09/23  0651 12/08/23  0350   WBC AUTO x10*3/uL 4.6 7.6   HEMOGLOBIN g/dL 12.9* 13.8   HEMATOCRIT % 41.4 43.9   PLATELETS AUTO x10*3/uL 252 262   NEUTROS PCT AUTO %  --  69.7   LYMPHS PCT AUTO %  --  19.6   MONOS PCT AUTO %  --  8.2   EOS PCT  "AUTO %  --  1.7     Results from last 72 hours   Lab Units 12/09/23  0651 12/08/23  0350   SODIUM mmol/L 137 136   POTASSIUM mmol/L 4.5 5.0   CHLORIDE mmol/L 106 103   CO2 mmol/L 23 26   BUN mg/dL 19 23   CREATININE mg/dL 0.79 0.78   GLUCOSE mg/dL 103* 105*   CALCIUM mg/dL 8.3* 8.6   ANION GAP mmol/L 13 12   EGFR mL/min/1.73m*2 >90 >90   PHOSPHORUS mg/dL 4.0  --      Results from last 72 hours   Lab Units 12/09/23  0651   ALBUMIN g/dL 3.5     Estimated Creatinine Clearance: 121.4 mL/min (by C-G formula based on SCr of 0.79 mg/dL).  No results found for: \"CRP\", \"SEDRATE\"  No results found for: \"HIV1X2\", \"HIVCONF\", \"FTOXEK1EW\"  No results found for: \"HEPCABINIT\", \"HEPCAB\", \"HCVPCRQUANT\"  Microbiology  Reviewed  Imaging  Reviewed       Assessment/Plan     Right ankle / leg abrasion / cellulitis    Recommendations :  Cefazolin 2 gm iv every 8 hours  Keep the leg elevated    I spent minutes in the professional and overall care of this patient.      Giulia Jones MD  "

## 2023-12-10 LAB
ANION GAP SERPL CALC-SCNC: 12 MMOL/L (ref 10–20)
BUN SERPL-MCNC: 15 MG/DL (ref 6–23)
CALCIUM SERPL-MCNC: 8.3 MG/DL (ref 8.6–10.3)
CHLORIDE SERPL-SCNC: 106 MMOL/L (ref 98–107)
CO2 SERPL-SCNC: 22 MMOL/L (ref 21–32)
CREAT SERPL-MCNC: 0.7 MG/DL (ref 0.5–1.3)
GFR SERPL CREATININE-BSD FRML MDRD: >90 ML/MIN/1.73M*2
GLUCOSE BLD MANUAL STRIP-MCNC: 114 MG/DL (ref 74–99)
GLUCOSE BLD MANUAL STRIP-MCNC: 125 MG/DL (ref 74–99)
GLUCOSE BLD MANUAL STRIP-MCNC: 129 MG/DL (ref 74–99)
GLUCOSE BLD MANUAL STRIP-MCNC: 136 MG/DL (ref 74–99)
GLUCOSE SERPL-MCNC: 99 MG/DL (ref 74–99)
POTASSIUM SERPL-SCNC: 4.4 MMOL/L (ref 3.5–5.3)
SODIUM SERPL-SCNC: 136 MMOL/L (ref 136–145)

## 2023-12-10 PROCEDURE — 36415 COLL VENOUS BLD VENIPUNCTURE: CPT | Performed by: INTERNAL MEDICINE

## 2023-12-10 PROCEDURE — 97165 OT EVAL LOW COMPLEX 30 MIN: CPT | Mod: GO | Performed by: OCCUPATIONAL THERAPIST

## 2023-12-10 PROCEDURE — G0378 HOSPITAL OBSERVATION PER HR: HCPCS

## 2023-12-10 PROCEDURE — 96372 THER/PROPH/DIAG INJ SC/IM: CPT | Performed by: FAMILY MEDICINE

## 2023-12-10 PROCEDURE — 82947 ASSAY GLUCOSE BLOOD QUANT: CPT | Mod: 59

## 2023-12-10 PROCEDURE — 80048 BASIC METABOLIC PNL TOTAL CA: CPT | Performed by: INTERNAL MEDICINE

## 2023-12-10 PROCEDURE — 2500000004 HC RX 250 GENERAL PHARMACY W/ HCPCS (ALT 636 FOR OP/ED): Performed by: INTERNAL MEDICINE

## 2023-12-10 PROCEDURE — 99232 SBSQ HOSP IP/OBS MODERATE 35: CPT | Performed by: FAMILY MEDICINE

## 2023-12-10 PROCEDURE — 94760 N-INVAS EAR/PLS OXIMETRY 1: CPT

## 2023-12-10 PROCEDURE — 2500000004 HC RX 250 GENERAL PHARMACY W/ HCPCS (ALT 636 FOR OP/ED): Performed by: FAMILY MEDICINE

## 2023-12-10 PROCEDURE — 2500000001 HC RX 250 WO HCPCS SELF ADMINISTERED DRUGS (ALT 637 FOR MEDICARE OP): Performed by: FAMILY MEDICINE

## 2023-12-10 RX ADMIN — OXYBUTYNIN CHLORIDE 5 MG: 5 TABLET ORAL at 15:18

## 2023-12-10 RX ADMIN — BUSPIRONE HYDROCHLORIDE 10 MG: 10 TABLET ORAL at 03:28

## 2023-12-10 RX ADMIN — Medication 1 TABLET: at 20:05

## 2023-12-10 RX ADMIN — NYSTATIN: 100000 CREAM TOPICAL at 20:07

## 2023-12-10 RX ADMIN — LORATADINE 10 MG: 10 TABLET ORAL at 08:05

## 2023-12-10 RX ADMIN — TRAZODONE HYDROCHLORIDE 50 MG: 50 TABLET ORAL at 12:17

## 2023-12-10 RX ADMIN — PANTOPRAZOLE SODIUM 40 MG: 40 TABLET, DELAYED RELEASE ORAL at 12:17

## 2023-12-10 RX ADMIN — ENOXAPARIN SODIUM 40 MG: 40 INJECTION SUBCUTANEOUS at 12:17

## 2023-12-10 RX ADMIN — VILAZODONE HYDROCHLORIDE 40 MG: 20 TABLET ORAL at 12:17

## 2023-12-10 RX ADMIN — PHENYTOIN SODIUM 200 MG: 100 CAPSULE ORAL at 15:18

## 2023-12-10 RX ADMIN — ASPIRIN 81 MG: 81 TABLET, COATED ORAL at 08:05

## 2023-12-10 RX ADMIN — PHENYTOIN SODIUM 200 MG: 100 CAPSULE ORAL at 20:05

## 2023-12-10 RX ADMIN — CEFAZOLIN SODIUM 2 G: 2 INJECTION, SOLUTION INTRAVENOUS at 03:28

## 2023-12-10 RX ADMIN — FLUTICASONE PROPIONATE 2 SPRAY: 50 SPRAY, METERED NASAL at 08:06

## 2023-12-10 RX ADMIN — Medication 6 MG: at 20:06

## 2023-12-10 RX ADMIN — BUSPIRONE HYDROCHLORIDE 10 MG: 10 TABLET ORAL at 20:05

## 2023-12-10 RX ADMIN — OXYBUTYNIN CHLORIDE 5 MG: 5 TABLET ORAL at 20:05

## 2023-12-10 RX ADMIN — BUSPIRONE HYDROCHLORIDE 10 MG: 10 TABLET ORAL at 12:16

## 2023-12-10 RX ADMIN — Medication 1 TABLET: at 08:05

## 2023-12-10 RX ADMIN — FINASTERIDE 5 MG: 5 TABLET, FILM COATED ORAL at 12:16

## 2023-12-10 RX ADMIN — CEFAZOLIN SODIUM 2 G: 2 INJECTION, SOLUTION INTRAVENOUS at 20:05

## 2023-12-10 RX ADMIN — LISINOPRIL 5 MG: 5 TABLET ORAL at 12:17

## 2023-12-10 RX ADMIN — NYSTATIN: 100000 CREAM TOPICAL at 08:06

## 2023-12-10 RX ADMIN — CEFAZOLIN SODIUM 2 G: 2 INJECTION, SOLUTION INTRAVENOUS at 12:17

## 2023-12-10 RX ADMIN — PHENYTOIN SODIUM 200 MG: 100 CAPSULE ORAL at 08:05

## 2023-12-10 RX ADMIN — OXYBUTYNIN CHLORIDE 5 MG: 5 TABLET ORAL at 08:05

## 2023-12-10 ASSESSMENT — COGNITIVE AND FUNCTIONAL STATUS - GENERAL
TOILETING: A LOT
DRESSING REGULAR UPPER BODY CLOTHING: A LITTLE
DRESSING REGULAR LOWER BODY CLOTHING: A LOT
DAILY ACTIVITIY SCORE: 16
HELP NEEDED FOR BATHING: A LOT
PERSONAL GROOMING: A LITTLE

## 2023-12-10 ASSESSMENT — ACTIVITIES OF DAILY LIVING (ADL)
ADL_ASSISTANCE: INDEPENDENT
BATHING_ASSISTANCE: MODERATE

## 2023-12-10 ASSESSMENT — PAIN - FUNCTIONAL ASSESSMENT
PAIN_FUNCTIONAL_ASSESSMENT: 0-10
PAIN_FUNCTIONAL_ASSESSMENT: 0-10

## 2023-12-10 ASSESSMENT — PAIN SCALES - GENERAL
PAINLEVEL_OUTOF10: 0 - NO PAIN
PAINLEVEL_OUTOF10: 0 - NO PAIN

## 2023-12-10 NOTE — PROGRESS NOTES
Occupational Therapy    Evaluation    Patient Name: Shon Boyd Jr.  MRN: [id number]  Today's Date: 12/10/2023  Time Calculation  Start Time: 0926  Stop Time: 0949  Time Calculation (min): 23 min    Assessment  IP OT Assessment  Prognosis: Good  Evaluation/Treatment Tolerance: Patient tolerated treatment well  Medical Staff Made Aware: Yes  End of Session Communication: Bedside nurse  End of Session Patient Position: Bed, 2 rail up, Alarm on (sitting EOB)  Plan:  Treatment Interventions: ADL retraining, Equipment evaluation/education, Patient/family training, Functional transfer training  OT Frequency: 2 times per week  OT Discharge Recommendations: Moderate intensity level of continued care  OT - OK to Discharge: Yes    Subjective   Current Problem:  1. Cellulitis        2. Periprosthetic fracture of shaft of femur  XR femur left 2+ views    XR femur left 2+ views      3. Cellulitis of foot          General:  General  Reason for Referral: OT for ADL and safety assessemnt  Referred By: Erick  Past Medical History Relevant to Rehab: BPH; chronic L shoulder dislocation; COPD; DM; Gastric ulcer; GERD; HLD; ANGLE; seizure  Family/Caregiver Present: No  Prior to Session Communication: Bedside nurse  Patient Position Received: Bed, 3 rail up, Alarm on  Preferred Learning Style: auditory, verbal  General Comment: 68 YOM admitted from home (group home setting) with LLE pain; pt presents with cellulitis.  L foot/ankle wrapped and elevated on pillow upon arrival.  Pt educated on reason for consultation and acute services.  Pt reports understanding from previous rehab experience. Agreeable to participate.  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)  Orientation Level: Oriented  X4  Attention:  (pt repeats himself)  Insight: Mild  Processing Speed: Delayed     Confusion Assessment Method (CAM)  Acute Onset and Fluctuating Course (1A): No  Bermudez Agitation Sedation Scale  Bermudez Agitation Sedation Scale (RASS): Alert and calm  Home Living:  Type of Home: Group home  Lives With: Other (Comment) (Staff and other residents)  Home Adaptive Equipment: Walker rolling or standard, Wheelchair-manual  Home Layout: One level  Home Access: No concerns  Bathroom Shower/Tub: Walk-in shower  Bathroom Toilet: Handicapped height  Bathroom Equipment: Grab bars in shower, Shower chair with back, Grab bars around toilet  Home Living Comments: handicap accessible   Prior Function:  Level of [place]: Independent with ADLs and functional transfers, Needs assistance with homemaking (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)  Receives Help From:  (Group home staff)  ADL Assistance: Independent  Homemaking Assistance: Needs assistance  Ambulatory Assistance: Independent (uses manual wheelchair or wheeled walker for short distances PTA)  Vocational: Full time employment (at workshop)  Hand Dominance: Right  IADL History:  Current License: No  ADL:  Eating Assistance: Independent  Grooming Assistance: Independent  Bathing Assistance: Moderate  UE Dressing Assistance: Independent  LE Dressing Assistance: Moderate  Toileting Assistance with Device: Moderate  Functional Assistance: Moderate  Activity Tolerance:  Endurance: Endurance does not limit participation in activity, Tolerates less than 10 min exercise with changes in vital signs  Early Mobility/Exercise Safety Screen: Proceed with mobilization - No exclusion criteria met  Activity Tolerance Comments: Pt tolerated standing at bedside with bilateral UE support x 4 minutes; pt requested to sit EOB at end of session to drink coffee  Bed Mobility/Transfers: Bed Mobility  Bed Mobility: Yes  Bed Mobility 1  Bed Mobility 1: Supine to sitting  Level of Assistance 1:  Minimum assistance  Bed Mobility Comments 1: use of rails and assist from therapist    Transfers  Transfer: Yes  Transfer 1  Technique 1: Sit to stand, Stand to sit  Transfer Device 1: Walker  Transfer Level of Assistance 1: Minimum assistance, Minimal verbal cues  Trials/Comments 1: from raised bed level; pt reports hospital bed in lowest position is lower than bed at home; bed raised to simulated height at home.  Pt was Mod A for sit to  low position and min A for sit to stand from elevated position      Ambulation/Gait Training:  Ambulation/Gait Training  Ambulation/Gait Training Performed: Yes  Ambulation/Gait Training 1  Surface 1: Level tile  Device 1: Rolling walker  Assistance 1: Minimum assistance  Comments/Distance (ft) 1: pt side stepped x 2 feet with use of WW and min A for balance  Sitting Balance:  Static Sitting Balance  Static Sitting-Balance Support: No upper extremity supported  Static Sitting-Level of Assistance: Independent  Dynamic Sitting Balance  Dynamic Sitting-Balance Support: No upper extremity supported  Dynamic Sitting-Balance: Forward lean, Trunk control activities, Reaching across midline, Reaching for objects  Dynamic Sitting-Comments: CGA/min A to attempt LB dressing  Standing Balance:  Static Standing Balance  Static Standing-Balance Support: Bilateral upper extremity supported  Static Standing-Level of Assistance: Contact guard  Static Standing-Comment/Number of Minutes: pt stood x 4 minutes  Dynamic Standing Balance  Dynamic Standing-Balance Support: Bilateral upper extremity supported  Dynamic Standing-Balance:  (wt shifting and side stepping)  Dynamic Standing-Comments: min A/CGA with cues for sequencing  Modalities:    Current License: No  Vision: Vision - Basic Assessment  Current Vision: No visual deficits  Sensation:  Light Touch: No apparent deficits (pt did report occassional decreased sensation in B feet due to diabetes)  Proprioception: No apparent  deficits  Strength:  Strength Comments: RUE WFL; LUE WFL with exception of L shoulder  Hand Function:  Hand Function  Gross Grasp: Functional  Coordination: Functional  Extremities: RUE   RUE : Within Functional Limits and LUE   LUE: Exceptions to WFL  LUE AROM (degrees)  LUE AROM Comment: Pt with chronic shoulder dislocation.  Flexion limited to less than 45 degrees      Outcome Measures: [facility] Daily Activity  Putting on and taking off regular lower body clothing: A lot  Bathing (including washing, rinsing, drying): A lot  Putting on and taking off regular upper body clothing: A little  Toileting, which includes using toilet, bedpan or urinal: A lot  Taking care of personal grooming such as brushing teeth: A little  Eating Meals: None  Daily Activity - Total Score: 16      Education Documentation  Precautions, taught by Flor Bean OT at 12/10/2023 11:47 AM.  Learner: Patient  Readiness: Acceptance  Method: Explanation  Response: Verbalizes Understanding, Needs Reinforcement    Home Exercise Program, taught by Flor Bean OT at 12/10/2023 11:47 AM.  Learner: Patient  Readiness: Acceptance  Method: Explanation  Response: Verbalizes Understanding, Needs Reinforcement    ADL Training, taught by Flor Bean OT at 12/10/2023 11:47 AM.  Learner: Patient  Readiness: Acceptance  Method: Explanation  Response: Verbalizes Understanding, Needs Reinforcement    Education Comments  No comments found.      Goals:   Encounter Problems       Encounter Problems (Active)       ADLs       Patient with complete upper body dressing with independent level of assistance donning and doffing all UE clothes with no adaptive equipment while edge of bed        Start:  12/10/23    Expected End:  12/24/23            Patient with complete lower body dressing with modified independent level of assistance donning and doffing all LE clothes  with PRN adaptive equipment while edge of bed  and standing       Start:  12/10/23    Expected End:   12/24/23            Patient will complete daily grooming tasks brushing teeth, shaving, and washing face/hair with modified independent level of assistance and PRN adaptive equipment while supported sitting, edge of bed , and standing.       Start:  12/10/23    Expected End:  12/24/23            Patient will complete toileting including hygiene clothing management/hygiene with modified independent level of assistance and raised toilet seat and grab bars.       Start:  12/10/23    Expected End:  12/24/23               BALANCE       Pt will maintain dynamic standing balance during ADL task with modified independent level of assistance in order to demonstrate decreased risk of falling and improved postural control.       Start:  12/10/23    Expected End:  12/24/23               TRANSFERS       Patient will complete functional transfer with least restrictive device with modified independent level of assistance.       Start:  12/10/23    Expected End:  12/24/23                     Unknown/Not sure

## 2023-12-10 NOTE — PROGRESS NOTES
Shon Boyd Jr. is a 68 y.o. male on day 0 of admission presenting with Cellulitis.    Subjective   Interval History: no fever, no new complaints         Review of Systems    Objective   Range of Vitals (last 24 hours)  Heart Rate:  [56-75]   Temp:  [36.1 °C (97 °F)-36.2 °C (97.2 °F)]   Resp:  [16-18]   BP: (102-115)/(63-73)   SpO2:  [95 %-97 %]   Daily Weight  12/08/23 : 120 kg (265 lb)    Body mass index is 35.94 kg/m².    Physical Exam  Constitutional:       Appearance: Normal appearance.   HENT:      Head: Normocephalic and atraumatic.      Mouth/Throat:      Mouth: Mucous membranes are moist.      Pharynx: Oropharynx is clear.   Eyes:      Pupils: Pupils are equal, round, and reactive to light.   Cardiovascular:      Rate and Rhythm: Normal rate and regular rhythm.      Heart sounds: Normal heart sounds.   Pulmonary:      Effort: Pulmonary effort is normal.      Breath sounds: Normal breath sounds.   Abdominal:      General: Abdomen is flat. Bowel sounds are normal.      Palpations: Abdomen is soft.   Musculoskeletal:      Cervical back: Normal range of motion.      Comments: Less edema and redness in the Rt ankle   Neurological:      Mental Status: He is alert.         Antibiotics  piperacillin-tazobactam-dextrose (Zosyn) IV 3.375 g  acetaminophen (Tylenol) tablet 650 mg  acetaminophen (Tylenol) oral liquid 650 mg  acetaminophen (Tylenol) suppository 650 mg  ondansetron (Zofran) tablet 4 mg  ondansetron (Zofran) injection 4 mg  polyethylene glycol (Glycolax, Miralax) packet 17 g  enoxaparin (Lovenox) syringe 40 mg  aspirin EC tablet 81 mg  busPIRone (Buspar) tablet 10 mg  calcium carbonate-vitamin D3 500 mg-5 mcg (200 unit) per tablet 1 tablet  loratadine (Claritin) tablet 10 mg  finasteride (Proscar) tablet 5 mg  fluticasone (Flonase) nasal spray 2 spray  lisinopril tablet 5 mg  melatonin tablet 6 mg  oxybutynin (Ditropan) tablet 5 mg  pantoprazole (ProtoNix) EC tablet 40 mg  phenytoin ER (Dilantin)  "capsule 200 mg  traZODone (Desyrel) tablet 50 mg  vilazodone (Viibryd) tablet 40 mg  enoxaparin (Lovenox) syringe 40 mg  acetaminophen (Tylenol) tablet 650 mg  acetaminophen (Tylenol) oral liquid 650 mg  acetaminophen (Tylenol) suppository 650 mg  ibuprofen tablet 400 mg  polyethylene glycol (Glycolax, Miralax) packet 17 g  piperacillin-tazobactam-dextrose (Zosyn) IV 3.375 g  dextrose 50 % injection 25 g  glucagon (Glucagen) injection 1 mg  dextrose 10 % in water (D10W) infusion  insulin lispro (HumaLOG) injection 0-10 Units  nystatin (Mycostatin) cream  ceFAZolin (Ancef) 2 g IV in dextrose 5% 50 mL  ceFAZolin in dextrose (iso-os) (Ancef) IVPB 2 g      Relevant Results  Labs  Results from last 72 hours   Lab Units 12/09/23  0651 12/08/23  0350   WBC AUTO x10*3/uL 4.6 7.6   HEMOGLOBIN g/dL 12.9* 13.8   HEMATOCRIT % 41.4 43.9   PLATELETS AUTO x10*3/uL 252 262   NEUTROS PCT AUTO %  --  69.7   LYMPHS PCT AUTO %  --  19.6   MONOS PCT AUTO %  --  8.2   EOS PCT AUTO %  --  1.7     Results from last 72 hours   Lab Units 12/10/23  0659 12/09/23  0651 12/08/23  0350   SODIUM mmol/L 136 137 136   POTASSIUM mmol/L 4.4 4.5 5.0   CHLORIDE mmol/L 106 106 103   CO2 mmol/L 22 23 26   BUN mg/dL 15 19 23   CREATININE mg/dL 0.70 0.79 0.78   GLUCOSE mg/dL 99 103* 105*   CALCIUM mg/dL 8.3* 8.3* 8.6   ANION GAP mmol/L 12 13 12   EGFR mL/min/1.73m*2 >90 >90 >90   PHOSPHORUS mg/dL  --  4.0  --      Results from last 72 hours   Lab Units 12/09/23  0651   ALBUMIN g/dL 3.5     Estimated Creatinine Clearance: 125 mL/min (by C-G formula based on SCr of 0.7 mg/dL).  No results found for: \"CRP\"  Microbiology  Reviewed  Imaging  Reviewed          Assessment/Plan     Right ankle / leg abrasion / cellulitis     Recommendations :  Plan on oral Keflex x 1 week  Discussed with the medical team     I spent minutes in the professional and overall care of this patient.      Giulia Jones MD  "

## 2023-12-10 NOTE — PROGRESS NOTES
Shon Boyd Jr. is a 68 y.o. male on day 0 of admission presenting with Cellulitis.       Subjective   Right foot/ankle improved.       Meds:  Scheduled medications  aspirin, 81 mg, oral, Daily  busPIRone, 10 mg, oral, q8h  calcium carbonate-vitamin D3, 1 tablet, oral, BID  ceFAZolin, 2 g, intravenous, q8h  enoxaparin, 40 mg, subcutaneous, q24h  finasteride, 5 mg, oral, q24h  fluticasone, 2 spray, Each Nostril, Daily  insulin lispro, 0-10 Units, subcutaneous, TID with meals  lisinopril, 5 mg, oral, q24h  loratadine, 10 mg, oral, Daily  nystatin, , Topical, BID  oxybutynin, 5 mg, oral, TID  pantoprazole, 40 mg, oral, q24h  phenytoin ER, 200 mg, oral, TID  polyethylene glycol, 17 g, oral, Daily  traZODone, 50 mg, oral, q24h  vilazodone, 40 mg, oral, q24h        Continuous medications  PRN medications  PRN medications: acetaminophen **OR** acetaminophen **OR** acetaminophen, dextrose 10 % in water (D10W), dextrose, glucagon, ibuprofen, melatonin, ondansetron **OR** ondansetron           Physical Exam     Constitutional:       Appearance: He is obese.   HENT:      Head: Normocephalic and atraumatic.      Nose: Nose normal.      Mouth/Throat:      Mouth: Mucous membranes are moist.   Eyes:      Extraocular Movements: Extraocular movements intact.      Pupils: Pupils are equal, round, and reactive to light.   Cardiovascular:      Rate and Rhythm: Normal rate.      Heart sounds: Murmur heard.   Pulmonary:      Effort: Pulmonary effort is normal.      Breath sounds: Normal breath sounds.   Abdominal:      General: Bowel sounds are normal.      Palpations: Abdomen is soft.      Tenderness: There is no abdominal tenderness. There is no guarding.      Hernia: No hernia is present.   Musculoskeletal:      Cervical back: Normal range of motion.   Feet:      Comments: decreased erythema and edema to right foot/ankle with less tenderness to proximal ankle;  palpable pedal pulses;  excoriations/scratches to right lateral ankle  foot.  No active drainage.    Neurological:      Mental Status: He is alert.      Comments:  hard of hearing, dysarthric      Last Recorded Vitals  /73   Pulse 56   Temp 36.2 °C (97.2 °F)   Resp 16   Ht 1.829 m (6')   Wt 120 kg (265 lb)        Relevant Results     Results for orders placed or performed during the hospital encounter of 12/08/23 (from the past 24 hour(s))   POCT GLUCOSE   Result Value Ref Range    POCT Glucose 179 (H) 74 - 99 mg/dL   POCT GLUCOSE   Result Value Ref Range    POCT Glucose 86 74 - 99 mg/dL   POCT GLUCOSE   Result Value Ref Range    POCT Glucose 167 (H) 74 - 99 mg/dL   Basic metabolic panel   Result Value Ref Range    Glucose 99 74 - 99 mg/dL    Sodium 136 136 - 145 mmol/L    Potassium 4.4 3.5 - 5.3 mmol/L    Chloride 106 98 - 107 mmol/L    Bicarbonate 22 21 - 32 mmol/L    Anion Gap 12 10 - 20 mmol/L    Urea Nitrogen 15 6 - 23 mg/dL    Creatinine 0.70 0.50 - 1.30 mg/dL    eGFR >90 >60 mL/min/1.73m*2    Calcium 8.3 (L) 8.6 - 10.3 mg/dL   POCT GLUCOSE   Result Value Ref Range    POCT Glucose 114 (H) 74 - 99 mg/dL   No results found for the last 90 days.       Assessment/Plan   Right foot/ankle pain/swelling with cellulitis  CT ruled out acute fracture.  Foot/ankle improved today.  Compression wrap applied.  Keep foot elevated.  IV antibiotics per ID.  Keep right foot/leg elevated  No further intervention by podiatry.      Deanna Stringer DPM  [phone]

## 2023-12-10 NOTE — PROGRESS NOTES
12/10/23 1429   Discharge Planning   Living Arrangements Other (Comment)  (from a Group Home)   Support Systems Friends/neighbors;Other (Comment)  (caregiver at group home)   Assistance Needed Patient is from a group home with around the clock staff, is on room air at baseline, A&Ox2, uses a walker or wheelchair for ambulation, needs assistance for ADLs and does not drive- staff at group home transports   Type of Residence Group home   Home or Post Acute Services Post acute facilities (Rehab/SNF/etc)   Type of Post Acute Facility Services Skilled nursing;Rehab   Patient expects to be discharged to: D Group home vs SNF   Does the patient need discharge transport arranged? Yes   RoundTrip coordination needed? Yes   Has discharge transport been arranged? No     12/10/2023 1430 Per physician patient would like to DC to [facility] SNF if recommended by PT/OT evaluations. OT evaluation is complete, awaiting PT evaluation to determine next site of care.

## 2023-12-10 NOTE — PROGRESS NOTES
Shon Boyd Jr. is a 68 y.o. male on day 0 of admission presenting with Cellulitis.    Subjective   No acute events overnight       Objective     Physical Exam  Last Recorded Vitals  Blood pressure 115/73, pulse 56, temperature 36.2 °C (97.2 °F), resp. rate 16, height 1.829 m (6'), weight 120 kg (265 lb), SpO2 97 %.    Constitutional:       Appearance: He is obese.   HENT:      Head: Normocephalic and atraumatic.      Nose: Nose normal.      Mouth/Throat:      Mouth: Mucous membranes are moist.   Eyes:      Extraocular Movements: Extraocular movements intact.      Pupils: Pupils are equal, round, and reactive to light.   Cardiovascular:      Rate and Rhythm: Normal rate.      Heart sounds: Murmur heard.   Pulmonary:      Effort: Pulmonary effort is normal.      Breath sounds: Normal breath sounds.   Abdominal:      General: Bowel sounds are normal.      Palpations: Abdomen is soft.      Tenderness: There is no abdominal tenderness. There is no guarding.      Hernia: No improving is present.   Musculoskeletal:      Cervical back: Normal range of motion.   Feet:      Comments: right ankle is swollen, erythema improving  Neurological:      Mental Status: He is alert.      Comments:  hard of hearing, dysarthric     Intake/Output last 3 Shifts:  I/O last 3 completed shifts:  In: 400 (3.3 mL/kg) [IV Piggyback:400]  Out: 1775 (14.8 mL/kg) [Urine:1775 (0.4 mL/kg/hr)]  Weight: 120.2 kg         Assessment/Plan   Principal Problem:    Cellulitis  Active Problems:    Cellulitis of foot    Shon Boyd Jr. is a 68 y.o. male with medical history of type 2 diabetes mellitus COPD, disability requiring a wheelchair/walker and caregiver for balance who is hard of hearing was brought into the emergency room by his caregiver due to swelling of his right ankle     Right ankle swelling with erythema  X-ray reveals cortical irregularity of the talar head concerning for fracture  CT ruled out acute fracture  Podiatry advised there is  no further intervention by podiatry  ID consulted and discontinued zosyn and started ancef (Day 3 of ABX)  ID further advised a 1 week course of Keflex 500 mg 3 times daily upon discharge  Advised to keep leg elevated  Monitor for improvement     COPD  Not on home O2  Continue DuoNebs as needed     Type 2 diabetes mellitus  Continue insulin sliding scale  Diabetic diet  Continue Accu-Cheks  Hemoglobin A1c pending     Hypertension  Continue medications monitor vitals     Insomnia  Continue home medication     Seizure disorder  Continue home medication     DVT prophylaxis  Lovenox and SCDs     Code status  DNR as discussed with patient    Disposition  Occupational Therapy has recommended moderate intensity in therapy, patient advised she is interested in [facility]    Long Suarez DO

## 2023-12-11 ENCOUNTER — HOME HEALTH ADMISSION (OUTPATIENT)
Dept: HOME HEALTH SERVICES | Facility: HOME HEALTH | Age: 68
End: 2023-12-11
Payer: MEDICARE

## 2023-12-11 VITALS
HEART RATE: 62 BPM | OXYGEN SATURATION: 95 % | HEIGHT: 72 IN | SYSTOLIC BLOOD PRESSURE: 155 MMHG | DIASTOLIC BLOOD PRESSURE: 92 MMHG | TEMPERATURE: 97.2 F | RESPIRATION RATE: 17 BRPM | BODY MASS INDEX: 35.89 KG/M2 | WEIGHT: 265 LBS

## 2023-12-11 DIAGNOSIS — M97.8XXA PERIPROSTHETIC FRACTURE OF SHAFT OF FEMUR: Primary | ICD-10-CM

## 2023-12-11 DIAGNOSIS — Z96.649 PERIPROSTHETIC FRACTURE OF SHAFT OF FEMUR: Primary | ICD-10-CM

## 2023-12-11 PROBLEM — L03.90 CELLULITIS: Status: RESOLVED | Noted: 2023-12-08 | Resolved: 2023-12-11

## 2023-12-11 LAB
GLUCOSE BLD MANUAL STRIP-MCNC: 110 MG/DL (ref 74–99)
GLUCOSE BLD MANUAL STRIP-MCNC: 129 MG/DL (ref 74–99)

## 2023-12-11 PROCEDURE — G0378 HOSPITAL OBSERVATION PER HR: HCPCS

## 2023-12-11 PROCEDURE — 96365 THER/PROPH/DIAG IV INF INIT: CPT

## 2023-12-11 PROCEDURE — 96372 THER/PROPH/DIAG INJ SC/IM: CPT | Performed by: FAMILY MEDICINE

## 2023-12-11 PROCEDURE — 96367 TX/PROPH/DG ADDL SEQ IV INF: CPT

## 2023-12-11 PROCEDURE — 99239 HOSP IP/OBS DSCHRG MGMT >30: CPT | Performed by: NURSE PRACTITIONER

## 2023-12-11 PROCEDURE — 82947 ASSAY GLUCOSE BLOOD QUANT: CPT | Mod: 91

## 2023-12-11 PROCEDURE — 2500000004 HC RX 250 GENERAL PHARMACY W/ HCPCS (ALT 636 FOR OP/ED): Performed by: FAMILY MEDICINE

## 2023-12-11 PROCEDURE — 96366 THER/PROPH/DIAG IV INF ADDON: CPT

## 2023-12-11 PROCEDURE — 97161 PT EVAL LOW COMPLEX 20 MIN: CPT | Mod: GP

## 2023-12-11 PROCEDURE — 2500000001 HC RX 250 WO HCPCS SELF ADMINISTERED DRUGS (ALT 637 FOR MEDICARE OP): Performed by: FAMILY MEDICINE

## 2023-12-11 PROCEDURE — 97530 THERAPEUTIC ACTIVITIES: CPT | Mod: GP

## 2023-12-11 PROCEDURE — 2500000004 HC RX 250 GENERAL PHARMACY W/ HCPCS (ALT 636 FOR OP/ED): Mod: JZ | Performed by: INTERNAL MEDICINE

## 2023-12-11 RX ORDER — CEPHALEXIN 500 MG/1
500 CAPSULE ORAL 2 TIMES DAILY
Qty: 14 CAPSULE | Refills: 0 | Status: SHIPPED | OUTPATIENT
Start: 2023-12-11 | End: 2023-12-18

## 2023-12-11 RX ORDER — ACETAMINOPHEN 325 MG/1
650 TABLET ORAL EVERY 4 HOURS PRN
Qty: 30 TABLET | Refills: 0 | Status: SHIPPED | OUTPATIENT
Start: 2023-12-11 | End: 2024-02-28 | Stop reason: WASHOUT

## 2023-12-11 RX ADMIN — OXYBUTYNIN CHLORIDE 5 MG: 5 TABLET ORAL at 08:44

## 2023-12-11 RX ADMIN — PHENYTOIN SODIUM 200 MG: 100 CAPSULE ORAL at 15:08

## 2023-12-11 RX ADMIN — FINASTERIDE 5 MG: 5 TABLET, FILM COATED ORAL at 11:31

## 2023-12-11 RX ADMIN — BUSPIRONE HYDROCHLORIDE 10 MG: 10 TABLET ORAL at 11:31

## 2023-12-11 RX ADMIN — PANTOPRAZOLE SODIUM 40 MG: 40 TABLET, DELAYED RELEASE ORAL at 11:31

## 2023-12-11 RX ADMIN — FLUTICASONE PROPIONATE 2 SPRAY: 50 SPRAY, METERED NASAL at 08:44

## 2023-12-11 RX ADMIN — OXYBUTYNIN CHLORIDE 5 MG: 5 TABLET ORAL at 15:08

## 2023-12-11 RX ADMIN — CEFAZOLIN SODIUM 2 G: 2 INJECTION, SOLUTION INTRAVENOUS at 03:54

## 2023-12-11 RX ADMIN — Medication 1 TABLET: at 08:44

## 2023-12-11 RX ADMIN — PHENYTOIN SODIUM 200 MG: 100 CAPSULE ORAL at 08:44

## 2023-12-11 RX ADMIN — TRAZODONE HYDROCHLORIDE 50 MG: 50 TABLET ORAL at 11:31

## 2023-12-11 RX ADMIN — CEFAZOLIN SODIUM 2 G: 2 INJECTION, SOLUTION INTRAVENOUS at 11:32

## 2023-12-11 RX ADMIN — LISINOPRIL 5 MG: 5 TABLET ORAL at 11:31

## 2023-12-11 RX ADMIN — LORATADINE 10 MG: 10 TABLET ORAL at 08:44

## 2023-12-11 RX ADMIN — ASPIRIN 81 MG: 81 TABLET, COATED ORAL at 08:44

## 2023-12-11 RX ADMIN — NYSTATIN: 100000 CREAM TOPICAL at 08:44

## 2023-12-11 RX ADMIN — BUSPIRONE HYDROCHLORIDE 10 MG: 10 TABLET ORAL at 03:54

## 2023-12-11 RX ADMIN — VILAZODONE HYDROCHLORIDE 40 MG: 20 TABLET ORAL at 11:33

## 2023-12-11 ASSESSMENT — COGNITIVE AND FUNCTIONAL STATUS - GENERAL
TURNING FROM BACK TO SIDE WHILE IN FLAT BAD: A LITTLE
MOVING FROM LYING ON BACK TO SITTING ON SIDE OF FLAT BED WITH BEDRAILS: A LITTLE
HELP NEEDED FOR BATHING: A LOT
MOBILITY SCORE: 18
CLIMB 3 TO 5 STEPS WITH RAILING: A LOT
WALKING IN HOSPITAL ROOM: A LITTLE
MOVING TO AND FROM BED TO CHAIR: A LOT
MOBILITY SCORE: 14
WALKING IN HOSPITAL ROOM: A LOT
DRESSING REGULAR LOWER BODY CLOTHING: A LOT
DRESSING REGULAR UPPER BODY CLOTHING: A LITTLE
MOVING FROM LYING ON BACK TO SITTING ON SIDE OF FLAT BED WITH BEDRAILS: A LITTLE
TOILETING: A LOT
PERSONAL GROOMING: A LITTLE
TURNING FROM BACK TO SIDE WHILE IN FLAT BAD: A LITTLE
STANDING UP FROM CHAIR USING ARMS: A LOT
DAILY ACTIVITIY SCORE: 16
CLIMB 3 TO 5 STEPS WITH RAILING: TOTAL

## 2023-12-11 ASSESSMENT — PAIN SCALES - GENERAL
PAINLEVEL_OUTOF10: 0 - NO PAIN
PAINLEVEL_OUTOF10: 0 - NO PAIN

## 2023-12-11 ASSESSMENT — PAIN - FUNCTIONAL ASSESSMENT
PAIN_FUNCTIONAL_ASSESSMENT: 0-10
PAIN_FUNCTIONAL_ASSESSMENT: 0-10

## 2023-12-11 NOTE — CARE PLAN
Problem: Fall/Injury  Goal: Not fall by end of shift  Outcome: Progressing  Goal: Be free from injury by end of the shift  Outcome: Progressing  Goal: Verbalize understanding of personal risk factors for fall in the hospital  Outcome: Progressing  Goal: Verbalize understanding of risk factor reduction measures to prevent injury from fall in the home  Outcome: Progressing  Goal: Use assistive devices by end of the shift  Outcome: Progressing  Goal: Pace activities to prevent fatigue by end of the shift  Outcome: Progressing     Problem: Pain - Adult  Goal: Verbalizes/displays adequate comfort level or baseline comfort level  Outcome: Progressing     Problem: Safety - Adult  Goal: Free from fall injury  Outcome: Progressing     Problem: Discharge Planning  Goal: Discharge to home or other facility with appropriate resources  Outcome: Progressing     Problem: Chronic Conditions and Co-morbidities  Goal: Patient's chronic conditions and co-morbidity symptoms are monitored and maintained or improved  Outcome: Progressing     Problem: Skin  Goal: Decreased wound size/increased tissue granulation at next dressing change  Outcome: Progressing  Goal: Participates in plan/prevention/treatment measures  Outcome: Progressing  Goal: Prevent/manage excess moisture  Outcome: Progressing  Goal: Prevent/minimize sheer/friction injuries  Outcome: Progressing  Goal: Promote/optimize nutrition  Outcome: Progressing  Goal: Promote skin healing  Outcome: Progressing     Problem: Diabetes  Goal: Achieve decreasing blood glucose levels by end of shift  Outcome: Progressing  Goal: Increase stability of blood glucose readings by end of shift  Outcome: Progressing  Goal: Decrease in ketones present in urine by end of shift  Outcome: Progressing  Goal: Maintain electrolyte levels within acceptable range throughout shift  Outcome: Progressing  Goal: Maintain glucose levels >70mg/dl to <250mg/dl throughout shift  Outcome: Progressing  Goal: No  changes in neurological exam by end of shift  Outcome: Progressing  Goal: Learn about and adhere to nutrition recommendations by end of shift  Outcome: Progressing  Goal: Vital signs within normal range for age by end of shift  Outcome: Progressing  Goal: Increase self care and/or family involovement by end of shift  Outcome: Progressing  Goal: Receive DSME education by end of shift  Outcome: Progressing   The patient's goals for the shift include      The clinical goals for the shift include pt will keep leg elevated while in bed
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
## 2023-12-11 NOTE — PROGRESS NOTES
Shon Boyd Jr. is a 68 y.o. male on day 0 of admission presenting with Cellulitis.    Subjective   Interval History: no fever, no new complaints         Review of Systems    Objective   Range of Vitals (last 24 hours)  Heart Rate:  [63-77]   Temp:  [36.1 °C (97 °F)-36.6 °C (97.9 °F)]   Resp:  [16-18]   BP: (104-126)/(67-79)   SpO2:  [92 %-95 %]   Daily Weight  12/08/23 : 120 kg (265 lb)    Body mass index is 35.94 kg/m².    Physical Exam  Constitutional:       Appearance: Normal appearance.   HENT:      Head: Normocephalic and atraumatic.      Mouth/Throat:      Mouth: Mucous membranes are moist.      Pharynx: Oropharynx is clear.   Eyes:      Pupils: Pupils are equal, round, and reactive to light.   Cardiovascular:      Rate and Rhythm: Normal rate and regular rhythm.      Heart sounds: Normal heart sounds.   Pulmonary:      Effort: Pulmonary effort is normal.      Breath sounds: Normal breath sounds.   Abdominal:      General: Abdomen is flat. Bowel sounds are normal.      Palpations: Abdomen is soft.   Musculoskeletal:      Cervical back: Normal range of motion.      Comments: Less edema and redness in the Rt ankle   Neurological:      Mental Status: He is alert.         Antibiotics  piperacillin-tazobactam-dextrose (Zosyn) IV 3.375 g  acetaminophen (Tylenol) tablet 650 mg  acetaminophen (Tylenol) oral liquid 650 mg  acetaminophen (Tylenol) suppository 650 mg  ondansetron (Zofran) tablet 4 mg  ondansetron (Zofran) injection 4 mg  polyethylene glycol (Glycolax, Miralax) packet 17 g  enoxaparin (Lovenox) syringe 40 mg  aspirin EC tablet 81 mg  busPIRone (Buspar) tablet 10 mg  calcium carbonate-vitamin D3 500 mg-5 mcg (200 unit) per tablet 1 tablet  loratadine (Claritin) tablet 10 mg  finasteride (Proscar) tablet 5 mg  fluticasone (Flonase) nasal spray 2 spray  lisinopril tablet 5 mg  melatonin tablet 6 mg  oxybutynin (Ditropan) tablet 5 mg  pantoprazole (ProtoNix) EC tablet 40 mg  phenytoin ER (Dilantin)  "capsule 200 mg  traZODone (Desyrel) tablet 50 mg  vilazodone (Viibryd) tablet 40 mg  enoxaparin (Lovenox) syringe 40 mg  acetaminophen (Tylenol) tablet 650 mg  acetaminophen (Tylenol) oral liquid 650 mg  acetaminophen (Tylenol) suppository 650 mg  ibuprofen tablet 400 mg  polyethylene glycol (Glycolax, Miralax) packet 17 g  piperacillin-tazobactam-dextrose (Zosyn) IV 3.375 g  dextrose 50 % injection 25 g  glucagon (Glucagen) injection 1 mg  dextrose 10 % in water (D10W) infusion  insulin lispro (HumaLOG) injection 0-10 Units  nystatin (Mycostatin) cream  ceFAZolin (Ancef) 2 g IV in dextrose 5% 50 mL  ceFAZolin in dextrose (iso-os) (Ancef) IVPB 2 g      Relevant Results  Labs  Results from last 72 hours   Lab Units 12/09/23  0651   WBC AUTO x10*3/uL 4.6   HEMOGLOBIN g/dL 12.9*   HEMATOCRIT % 41.4   PLATELETS AUTO x10*3/uL 252       Results from last 72 hours   Lab Units 12/10/23  0659 12/09/23  0651   SODIUM mmol/L 136 137   POTASSIUM mmol/L 4.4 4.5   CHLORIDE mmol/L 106 106   CO2 mmol/L 22 23   BUN mg/dL 15 19   CREATININE mg/dL 0.70 0.79   GLUCOSE mg/dL 99 103*   CALCIUM mg/dL 8.3* 8.3*   ANION GAP mmol/L 12 13   EGFR mL/min/1.73m*2 >90 >90   PHOSPHORUS mg/dL  --  4.0       Results from last 72 hours   Lab Units 12/09/23  0651   ALBUMIN g/dL 3.5       Estimated Creatinine Clearance: 125 mL/min (by C-G formula based on SCr of 0.7 mg/dL).  No results found for: \"CRP\"  Microbiology  Reviewed  Imaging  Reviewed          Assessment/Plan     Right ankle / leg abrasion / cellulitis     Recommendations :  Plan on oral Keflex x 1 week  Discussed with the medical team     I spent minutes in the professional and overall care of this patient.      Giulia Jones MD  "

## 2023-12-11 NOTE — DISCHARGE SUMMARY
Discharge Diagnosis  Cellulitis    Issues Requiring Follow-Up  Wound monitoring and care    Discharge Meds     Your medication list        START taking these medications        Instructions Last Dose Given Next Dose Due   acetaminophen 325 mg tablet  Commonly known as: Tylenol      Take 2 tablets (650 mg) by mouth every 4 hours if needed for mild pain (1 - 3).       cephalexin 500 mg capsule  Commonly known as: Keflex      Take 1 capsule (500 mg) by mouth 2 times a day for 7 days.              CONTINUE taking these medications        Instructions Last Dose Given Next Dose Due   aspirin 81 mg EC tablet           busPIRone 10 mg tablet  Commonly known as: Buspar           calcium carbonate-vitamin D3 600 mg-10 mcg (400 unit) tablet           cetirizine 10 mg tablet  Commonly known as: ZyrTEC           colestipol 1 gram tablet  Commonly known as: Colestid           finasteride 5 mg tablet  Commonly known as: Proscar           fluticasone 50 mcg/actuation nasal spray  Commonly known as: Flonase           lisinopril 5 mg tablet           melatonin 10 mg capsule           oxybutynin 5 mg tablet  Commonly known as: Ditropan           pantoprazole 40 mg EC tablet  Commonly known as: ProtoNix           phenytoin  mg capsule  Commonly known as: Dilantin           traZODone 50 mg tablet  Commonly known as: Desyrel           Viibryd 40 mg tablet  Generic drug: vilazodone                     Where to Get Your Medications        [facility] [address] [id number] [facility] [address]      Phone: [phone]   acetaminophen 325 mg tablet  cephalexin 500 mg capsule         Test Results Pending At Discharge  Pending Labs       Order Current Status    Blood Culture Preliminary result    Blood Culture Preliminary result            Hospital Course   Per documentation: Shon Boyd Jr. is a 68 y.o. male with medical history of type 2 diabetes mellitus COPD,  disability requiring a wheelchair/walker and caregiver for balance who is hard of hearing was brought into the emergency room by his caregiver due to swelling of his right ankle. X-ray revealed cortical irregularity of the talar head concerning for fracture and podiatry was consulted. CT ruled out fracture. He was treated with Ancef while inpatient. Discharging to the group home with HHC and Keflex for 7 days.    Pertinent Physical Exam At Time of Discharge  Constitutional:       Appearance: He is obese.   HENT:      Head: Normocephalic and atraumatic.      Nose: Nose normal.      Mouth/Throat:      Mouth: Mucous membranes are moist.   Eyes:      Extraocular Movements: Extraocular movements intact.      Pupils: Pupils are equal, round, and reactive to light.   Cardiovascular:      Rate and Rhythm: Normal rate.      Heart sounds: Murmur heard.   Pulmonary:      Effort: Pulmonary effort is normal.      Breath sounds: Normal breath sounds.   Abdominal:      General: Bowel sounds are normal.      Palpations: Abdomen is soft.      Tenderness: There is no abdominal tenderness. There is no guarding.      Hernia: No improving is present.   Musculoskeletal:      Cervical back: Normal range of motion.   Feet:      Comments: right ankle is swollen, erythema improving  Neurological:      Mental Status: He is alert.      Comments:  hard of hearing, dysarthric     Outpatient Follow-Up  Future Appointments   Date Time Provider Department Center   2/22/2024 10:00 AM Sanket Delacruz DO [email or web address]   4/24/2024  8:30 AM Clemente Renteria MD [id number] [place]         Clary Rivero, APRN-CNP

## 2023-12-12 ENCOUNTER — PATIENT OUTREACH (OUTPATIENT)
Dept: PRIMARY CARE | Facility: CLINIC | Age: 68
End: 2023-12-12
Payer: MEDICARE

## 2023-12-12 ENCOUNTER — DOCUMENTATION (OUTPATIENT)
Dept: PRIMARY CARE | Facility: CLINIC | Age: 68
End: 2023-12-12
Payer: MEDICARE

## 2023-12-12 ENCOUNTER — HOME CARE VISIT (OUTPATIENT)
Dept: HOME HEALTH SERVICES | Facility: HOME HEALTH | Age: 68
End: 2023-12-12
Payer: MEDICARE

## 2023-12-12 VITALS
HEART RATE: 75 BPM | RESPIRATION RATE: 16 BRPM | SYSTOLIC BLOOD PRESSURE: 140 MMHG | OXYGEN SATURATION: 98 % | TEMPERATURE: 98.1 F | DIASTOLIC BLOOD PRESSURE: 80 MMHG

## 2023-12-12 LAB
BACTERIA BLD CULT: NORMAL
BACTERIA BLD CULT: NORMAL

## 2023-12-12 PROCEDURE — G0299 HHS/HOSPICE OF RN EA 15 MIN: HCPCS | Mod: HHH

## 2023-12-12 PROCEDURE — 1090000002 HH PPS REVENUE DEBIT

## 2023-12-12 PROCEDURE — 1090000001 HH PPS REVENUE CREDIT

## 2023-12-12 PROCEDURE — 0023 HH SOC

## 2023-12-12 PROCEDURE — 169592 NO-PAY CLAIM PROCEDURE

## 2023-12-12 ASSESSMENT — ACTIVITIES OF DAILY LIVING (ADL)
OASIS_M1830: 06
AMBULATION ASSISTANCE: ONE PERSON
AMBULATION ASSISTANCE: 1
BATHING_CURRENT_FUNCTION: ONE PERSON
BATHING ASSESSED: 1

## 2023-12-12 ASSESSMENT — ENCOUNTER SYMPTOMS
LIMITED RANGE OF MOTION: 1
FREQUENCY: 1
APPETITE LEVEL: GOOD
MUSCLE WEAKNESS: 1
DESCRIPTION OF MEMORY LOSS: SHORT TERM
CHANGE IN APPETITE: UNCHANGED
HYPERTENSION: 1
BOWEL INCONTINENCE: 1
CONTUSION: 1
WANDERING: 1

## 2023-12-12 NOTE — PROGRESS NOTES
Discharge Facility: [facility]  Discharge Diagnosis: Cellulitis  Admission Date: 12/08/2023  Discharge Date: 12/11/2023    PCP Appointment Date: Tasked to office due to availability  Specialist Appointment Date: None  Hospital Encounter and Summary: Linked    See discharge assessment below for further details    Engagement  Call Start Time: 0940 (12/12/2023  9:44 AM)    Medications  Medications reviewed with patient/caregiver?: Yes (12/12/2023  9:44 AM)  Is the patient having any side effects they believe may be caused by any medication additions or changes?: No (12/12/2023  9:44 AM)  Does the patient have all medications ordered at discharge?: No (Will be getting medication today) (12/12/2023  9:44 AM)  Prescription Comments: Scripts given at discharge for Tylenol and Keflex (12/12/2023  9:44 AM)  Is the patient taking all medications as directed (includes completed medication regime)?: No (Will be getting medication from pharmacy today) (12/12/2023  9:44 AM)  Medication Comments: Patients caregiver DJ denies any issues obtaining or affording medication (12/12/2023  9:44 AM)    Appointments  Does the patient have a primary care provider?: Yes (12/12/2023  9:44 AM)  Care Management Interventions: -- (Tasked to office due to availability) (12/12/2023  9:44 AM)  Has the patient kept scheduled appointments due by today?: Yes (12/12/2023  9:44 AM)    Self Management  What is the home health agency?: [facility] (12/12/2023  9:44 AM)  Has home health visited the patient within 72 hours of discharge?: Yes (12/12/2023  9:44 AM)  What Durable Medical Equipment (DME) was ordered?: N/A (12/12/2023  9:44 AM)    Patient Teaching  Does the patient have access to their discharge instructions?: Yes (12/12/2023  9:44 AM)  Care Management Interventions: Reviewed instructions with patient (12/12/2023  9:44 AM)  What is the patient's perception of their health status since discharge?: Improving (12/12/2023  9:44 AM)  Is the patient/caregiver  able to teach back the hierarchy of who to call/visit for symptoms/problems? PCP, Specialist, Home Health nurse, Urgent Care, ED, 911: Yes (12/12/2023  9:44 AM)  Patient/Caregiver Education Comments: ALDO spoke to patients caregiver [phone]. He states that the patient is doing well. He denies any pain at this time. They will be getting discharge medication from the pharmacy today. He has PT at the home today. PCP follow up has been tasked to the office due to availability. They have no questions at this time. (12/12/2023  9:44 AM)

## 2023-12-13 PROCEDURE — 1090000002 HH PPS REVENUE DEBIT

## 2023-12-13 PROCEDURE — 1090000001 HH PPS REVENUE CREDIT

## 2023-12-13 RX ORDER — MULTIVITAMIN
1 TABLET ORAL 2 TIMES DAILY
Qty: 180 TABLET | Refills: 0 | Status: SHIPPED | OUTPATIENT
Start: 2023-12-13 | End: 2024-03-15 | Stop reason: SDUPTHER

## 2023-12-13 NOTE — TELEPHONE ENCOUNTER
Rx Refill Request Telephone Encounter    Name:  Shon BOLANOS Deb Johnson  :  [id number]  Medication Name:  Calcium Carb/Vit D   Dose : 600mg/400IU 1 tab twice daily  Quantity : 180tabs for #90 days    Specific Pharmacy location:  [facility]; pended in chart  Date of last appointment:  10/11/2023 bambi Renteria  Date of next appointment:  2023  Best number to reach patient:  [phone] (home)     Angelica Luke LPN

## 2023-12-14 ENCOUNTER — HOME CARE VISIT (OUTPATIENT)
Dept: HOME HEALTH SERVICES | Facility: HOME HEALTH | Age: 68
End: 2023-12-14
Payer: MEDICARE

## 2023-12-14 VITALS
HEART RATE: 78 BPM | TEMPERATURE: 97.2 F | RESPIRATION RATE: 16 BRPM | DIASTOLIC BLOOD PRESSURE: 82 MMHG | OXYGEN SATURATION: 98 % | SYSTOLIC BLOOD PRESSURE: 146 MMHG

## 2023-12-14 VITALS
OXYGEN SATURATION: 95 % | DIASTOLIC BLOOD PRESSURE: 90 MMHG | TEMPERATURE: 97.3 F | SYSTOLIC BLOOD PRESSURE: 150 MMHG | HEART RATE: 64 BPM

## 2023-12-14 PROCEDURE — G0151 HHCP-SERV OF PT,EA 15 MIN: HCPCS | Mod: HHH

## 2023-12-14 PROCEDURE — 1090000001 HH PPS REVENUE CREDIT

## 2023-12-14 PROCEDURE — G0300 HHS/HOSPICE OF LPN EA 15 MIN: HCPCS | Mod: HHH

## 2023-12-14 PROCEDURE — 1090000002 HH PPS REVENUE DEBIT

## 2023-12-14 SDOH — HEALTH STABILITY: PHYSICAL HEALTH
EXERCISE COMMENTS: INSTRUCTED IN SITTING ANKLE PUMPS, CIRCLES, LAQ, HIP FLEXION, STANDING KNEE FLEXION, HIP ABDUCTION, HIP FLEXION

## 2023-12-14 ASSESSMENT — ENCOUNTER SYMPTOMS
CHANGE IN APPETITE: UNCHANGED
OCCASIONAL FEELINGS OF UNSTEADINESS: 0
LOWER EXTREMITY EDEMA: 1
APPETITE LEVEL: GOOD
DENIES PAIN: 1
PERSON REPORTING PAIN: PATIENT

## 2023-12-14 ASSESSMENT — ACTIVITIES OF DAILY LIVING (ADL)
AMBULATION ASSISTANCE: STAND BY ASSIST
AMBULATION_DISTANCE/DURATION_TOLERATED: 50
AMBULATION ASSISTANCE: 1
AMBULATION ASSISTANCE ON FLAT SURFACES: 1

## 2023-12-14 NOTE — SIGNIFICANT EVENT
Follow Up Phone Call    Outgoing phone call    Spoke to: Shon Boyd Jr. Relationship:caregiver   Phone number: [phone]      Outcome: contacted patient/ family   Chief Complaint   Patient presents with    Ankle Pain     Pts right foot/ankle is swollen, red and warm to touch. Pt denies any injury or trauma          Diagnosis:Not applicable    Spoke to . States he is feeling well. No further questions or concerns.
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
## 2023-12-15 PROCEDURE — 1090000001 HH PPS REVENUE CREDIT

## 2023-12-15 PROCEDURE — 1090000002 HH PPS REVENUE DEBIT

## 2023-12-16 ENCOUNTER — HOME CARE VISIT (OUTPATIENT)
Dept: HOME HEALTH SERVICES | Facility: HOME HEALTH | Age: 68
End: 2023-12-16
Payer: MEDICARE

## 2023-12-16 VITALS — HEART RATE: 71 BPM | OXYGEN SATURATION: 98 %

## 2023-12-16 PROCEDURE — 1090000002 HH PPS REVENUE DEBIT

## 2023-12-16 PROCEDURE — G0152 HHCP-SERV OF OT,EA 15 MIN: HCPCS | Mod: HHH

## 2023-12-16 PROCEDURE — 1090000001 HH PPS REVENUE CREDIT

## 2023-12-16 SDOH — ECONOMIC STABILITY: HOUSING INSECURITY
HOME SAFETY: ANY ENVT CHANGES WOULD NEED TO BE STRUCTURAL IE WIDENING DOOR TO LAUNDRY FOR PT TO FIT WC INTO THIS ROOM ...PT MANAGES USING COMBO OF WC AND WALKER ...HAS NOT HAD ANY FALLS

## 2023-12-16 ASSESSMENT — ACTIVITIES OF DAILY LIVING (ADL)
PREPARING MEALS: NEEDS ASSISTANCE
TOILETING: 1
BATHING_CURRENT_FUNCTION: INDEPENDENT
DRESSING_LB_CURRENT_FUNCTION: INDEPENDENT
BATHING ASSESSED: 1
TOILETING: INDEPENDENT
DRESSING_UB_CURRENT_FUNCTION: INDEPENDENT

## 2023-12-16 ASSESSMENT — ENCOUNTER SYMPTOMS: DENIES PAIN: 1

## 2023-12-17 PROCEDURE — 1090000001 HH PPS REVENUE CREDIT

## 2023-12-17 PROCEDURE — 1090000002 HH PPS REVENUE DEBIT

## 2023-12-18 PROCEDURE — 1090000001 HH PPS REVENUE CREDIT

## 2023-12-18 PROCEDURE — 1090000002 HH PPS REVENUE DEBIT

## 2023-12-19 ENCOUNTER — HOME CARE VISIT (OUTPATIENT)
Dept: HOME HEALTH SERVICES | Facility: HOME HEALTH | Age: 68
End: 2023-12-19
Payer: MEDICARE

## 2023-12-19 VITALS
HEART RATE: 73 BPM | TEMPERATURE: 98.9 F | RESPIRATION RATE: 16 BRPM | DIASTOLIC BLOOD PRESSURE: 80 MMHG | OXYGEN SATURATION: 93 % | SYSTOLIC BLOOD PRESSURE: 150 MMHG

## 2023-12-19 PROCEDURE — 1090000002 HH PPS REVENUE DEBIT

## 2023-12-19 PROCEDURE — G0151 HHCP-SERV OF PT,EA 15 MIN: HCPCS | Mod: HHH

## 2023-12-19 PROCEDURE — 1090000001 HH PPS REVENUE CREDIT

## 2023-12-19 SDOH — HEALTH STABILITY: PHYSICAL HEALTH: EXERCISE COMMENTS: SITTING ANKLE PUMPS, LAQ, HIP FLEXION, HIP ABDUCTION 15 REPS

## 2023-12-19 ASSESSMENT — ACTIVITIES OF DAILY LIVING (ADL)
AMBULATION_DISTANCE/DURATION_TOLERATED: 50
AMBULATION ASSISTANCE: STAND BY ASSIST
AMBULATION ASSISTANCE: 1
AMBULATION ASSISTANCE ON FLAT SURFACES: 1

## 2023-12-19 ASSESSMENT — ENCOUNTER SYMPTOMS
PERSON REPORTING PAIN: PATIENT
DENIES PAIN: 1

## 2023-12-20 PROCEDURE — 1090000002 HH PPS REVENUE DEBIT

## 2023-12-20 PROCEDURE — 1090000001 HH PPS REVENUE CREDIT

## 2023-12-21 ENCOUNTER — HOME CARE VISIT (OUTPATIENT)
Dept: HOME HEALTH SERVICES | Facility: HOME HEALTH | Age: 68
End: 2023-12-21
Payer: MEDICARE

## 2023-12-21 ENCOUNTER — OFFICE VISIT (OUTPATIENT)
Dept: PRIMARY CARE | Facility: CLINIC | Age: 68
End: 2023-12-21
Payer: MEDICARE

## 2023-12-21 VITALS
SYSTOLIC BLOOD PRESSURE: 136 MMHG | OXYGEN SATURATION: 97 % | BODY MASS INDEX: 37.46 KG/M2 | DIASTOLIC BLOOD PRESSURE: 74 MMHG | HEIGHT: 72 IN | HEART RATE: 65 BPM | WEIGHT: 276.6 LBS

## 2023-12-21 DIAGNOSIS — M79.89 SWELLING OF RIGHT LOWER EXTREMITY: ICD-10-CM

## 2023-12-21 DIAGNOSIS — Z09 HOSPITAL DISCHARGE FOLLOW-UP: ICD-10-CM

## 2023-12-21 DIAGNOSIS — Z12.11 SCREEN FOR COLON CANCER: ICD-10-CM

## 2023-12-21 DIAGNOSIS — L03.115 CELLULITIS OF RIGHT ANKLE: Primary | ICD-10-CM

## 2023-12-21 PROBLEM — R26.9 ABNORMAL GAIT: Status: RESOLVED | Noted: 2023-09-07 | Resolved: 2023-12-21

## 2023-12-21 PROBLEM — Z04.9 CONDITION NOT FOUND: Status: RESOLVED | Noted: 2023-09-07 | Resolved: 2023-12-21

## 2023-12-21 PROBLEM — R29.818 SUSPECTED SLEEP APNEA: Status: RESOLVED | Noted: 2023-09-07 | Resolved: 2023-12-21

## 2023-12-21 PROBLEM — S42.302A LEFT HUMERAL FRACTURE: Status: RESOLVED | Noted: 2023-09-07 | Resolved: 2023-12-21

## 2023-12-21 PROBLEM — S22.49XA MULTIPLE RIB FRACTURES: Status: RESOLVED | Noted: 2023-09-07 | Resolved: 2023-12-21
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
## 2023-12-21 PROBLEM — S72.102A: Status: RESOLVED | Noted: 2023-09-07 | Resolved: 2023-12-21

## 2023-12-21 PROBLEM — L72.3 SEBACEOUS CYST: Status: RESOLVED | Noted: 2023-09-07 | Resolved: 2023-12-21

## 2023-12-21 PROBLEM — R06.83 SNORING: Status: RESOLVED | Noted: 2023-09-07 | Resolved: 2023-12-21

## 2023-12-21 PROCEDURE — 1126F AMNT PAIN NOTED NONE PRSNT: CPT | Performed by: FAMILY MEDICINE

## 2023-12-21 PROCEDURE — 3075F SYST BP GE 130 - 139MM HG: CPT | Performed by: FAMILY MEDICINE

## 2023-12-21 PROCEDURE — 1090000002 HH PPS REVENUE DEBIT

## 2023-12-21 PROCEDURE — G0180 MD CERTIFICATION HHA PATIENT: HCPCS | Performed by: FAMILY MEDICINE

## 2023-12-21 PROCEDURE — 1159F MED LIST DOCD IN RCRD: CPT | Performed by: FAMILY MEDICINE

## 2023-12-21 PROCEDURE — 1036F TOBACCO NON-USER: CPT | Performed by: FAMILY MEDICINE

## 2023-12-21 PROCEDURE — 99495 TRANSJ CARE MGMT MOD F2F 14D: CPT | Performed by: FAMILY MEDICINE

## 2023-12-21 PROCEDURE — 1090000001 HH PPS REVENUE CREDIT

## 2023-12-21 PROCEDURE — 1160F RVW MEDS BY RX/DR IN RCRD: CPT | Performed by: FAMILY MEDICINE

## 2023-12-21 PROCEDURE — 3078F DIAST BP <80 MM HG: CPT | Performed by: FAMILY MEDICINE

## 2023-12-21 PROCEDURE — 4010F ACE/ARB THERAPY RXD/TAKEN: CPT | Performed by: FAMILY MEDICINE

## 2023-12-21 RX ORDER — FUROSEMIDE 20 MG/1
TABLET ORAL
Qty: 30 TABLET | Refills: 0 | Status: SHIPPED | OUTPATIENT
Start: 2023-12-21 | End: 2024-01-09

## 2023-12-21 RX ORDER — CEPHALEXIN 500 MG/1
500 CAPSULE ORAL 2 TIMES DAILY
Qty: 14 CAPSULE | Refills: 0 | Status: SHIPPED | OUTPATIENT
Start: 2023-12-21 | End: 2023-12-28

## 2023-12-21 RX ORDER — FUROSEMIDE 20 MG/1
20 TABLET ORAL DAILY
Qty: 30 TABLET | Refills: 0 | Status: SHIPPED | OUTPATIENT
Start: 2023-12-21 | End: 2023-12-21

## 2023-12-21 RX ORDER — L. ACIDOPHILUS/LACTOBAC SPOR 35MM-25MM
TABLET ORAL
Qty: 30 TABLET | Refills: 0 | Status: SHIPPED | OUTPATIENT
Start: 2023-12-21 | End: 2024-01-09

## 2023-12-21 RX ORDER — WITCH HAZEL 50 %
1 PADS, MEDICATED (EA) TOPICAL DAILY
Qty: 30 TABLET | Refills: 0 | Status: SHIPPED | OUTPATIENT
Start: 2023-12-21 | End: 2024-02-28 | Stop reason: ALTCHOICE

## 2023-12-21 ASSESSMENT — PATIENT HEALTH QUESTIONNAIRE - PHQ9
2. FEELING DOWN, DEPRESSED OR HOPELESS: NOT AT ALL
1. LITTLE INTEREST OR PLEASURE IN DOING THINGS: NOT AT ALL
SUM OF ALL RESPONSES TO PHQ9 QUESTIONS 1 AND 2: 0

## 2023-12-21 ASSESSMENT — PAIN SCALES - GENERAL: PAINLEVEL: 0-NO PAIN

## 2023-12-21 NOTE — PATIENT INSTRUCTIONS
Here for hospital follow up.      Pt has edema/redness of right foot.  Recommend US to rule out DVT/vein issue.  Extend antibiotic x 1 week.  Recommend lactobacillus due to recent antibiotic use.  Use lasix once a day to help with cellulitis.  Refer to podiatry - Dr. Stringer if not improved.     Cologuard ordered

## 2023-12-21 NOTE — PROGRESS NOTES
Subjective   Patient ID: Shon Boyd Jr. is a 68 y.o. male who presents for Hospital Follow-up.    Here for hospital follow up.     Foot keeps hitting wheelchair.  Pt developed redness and swelling.  Patient went to ER - pt was diagnosed with cellulitis.  ?fracture on xray.  Pt saw podiatry - CT was negative for acute fracture.  Pt was placed on IV antibiotics and transitioned to oral antibiotics.  Patient has home health, PT/OT.      Saw ID - Dr. Jones and podiatry - Dr. Stringer.           Review of Systems    Objective   Ht 1.829 m (6')   Wt 125 kg (276 lb 9.6 oz)   BMI 37.51 kg/m²     Physical Exam  Vitals reviewed.   Constitutional:       General: He is not in acute distress.  Cardiovascular:      Rate and Rhythm: Normal rate and regular rhythm.   Pulmonary:      Effort: Pulmonary effort is normal.      Breath sounds: No wheezing or rhonchi.   Musculoskeletal:      Right lower leg: Edema present.      Left lower leg: No edema.      Comments: In wheelchair   Lymphadenopathy:      Cervical: No cervical adenopathy.   Skin:     Comments: Right ankle/foot - swollen, tender, erythematous   Neurological:      Mental Status: He is alert.         Assessment/Plan   Diagnoses and all orders for this visit:  Cellulitis of right ankle  -     cephalexin (Keflex) 500 mg capsule; Take 1 capsule (500 mg) by mouth 2 times a day for 7 days.  -     Lactobacillus acidophilus 1 billion cell tablet; Take 1 tablet by mouth once daily.  -     Referral to Podiatry; Future  Hospital discharge follow-up  Swelling of right lower extremity  -     Vascular US Lower Extremity Venous Duplex Right; Future  -     furosemide (Lasix) 20 mg tablet; Take 1 tablet (20 mg) by mouth once daily.  -     Referral to Podiatry; Future  Screen for colon cancer  -     Cologuard® colon cancer screening; Future    Patient Instructions   Here for hospital follow up.      Pt has edema/redness of right foot.  Recommend US to rule out DVT/vein issue.  Extend  antibiotic x 1 week.  Recommend lactobacillus due to recent antibiotic use.  Use lasix once a day to help with cellulitis.  Refer to podiatry - Dr. Stringer if not improved.     Cologuard ordered

## 2023-12-22 ENCOUNTER — HOME CARE VISIT (OUTPATIENT)
Dept: HOME HEALTH SERVICES | Facility: HOME HEALTH | Age: 68
End: 2023-12-22
Payer: MEDICARE

## 2023-12-22 ENCOUNTER — PATIENT OUTREACH (OUTPATIENT)
Dept: PRIMARY CARE | Facility: CLINIC | Age: 68
End: 2023-12-22
Payer: MEDICARE

## 2023-12-22 VITALS
RESPIRATION RATE: 16 BRPM | OXYGEN SATURATION: 95 % | TEMPERATURE: 98.2 F | SYSTOLIC BLOOD PRESSURE: 118 MMHG | HEART RATE: 75 BPM | DIASTOLIC BLOOD PRESSURE: 70 MMHG

## 2023-12-22 PROCEDURE — 1090000001 HH PPS REVENUE CREDIT

## 2023-12-22 PROCEDURE — 1090000002 HH PPS REVENUE DEBIT

## 2023-12-22 NOTE — PROGRESS NOTES
Call regarding appt. with PCP on 12/21/2023 after hospitalization.  At time of outreach call the patients florian Garcia feels as if their condition has improved since last visit.  Reviewed the PCP appointment with the pt and addressed any questions or concerns.

## 2023-12-23 PROCEDURE — 1090000002 HH PPS REVENUE DEBIT

## 2023-12-23 PROCEDURE — 1090000001 HH PPS REVENUE CREDIT

## 2023-12-24 PROCEDURE — 1090000002 HH PPS REVENUE DEBIT

## 2023-12-24 PROCEDURE — 1090000001 HH PPS REVENUE CREDIT

## 2023-12-25 PROCEDURE — 1090000002 HH PPS REVENUE DEBIT

## 2023-12-25 PROCEDURE — 1090000001 HH PPS REVENUE CREDIT

## 2023-12-26 PROCEDURE — 1090000002 HH PPS REVENUE DEBIT

## 2023-12-26 PROCEDURE — 1090000001 HH PPS REVENUE CREDIT

## 2023-12-26 SDOH — ECONOMIC STABILITY: GENERAL

## 2023-12-26 ASSESSMENT — ENCOUNTER SYMPTOMS
APPETITE LEVEL: GOOD
CHANGE IN APPETITE: UNCHANGED
PERSON REPORTING PAIN: PATIENT
FREQUENCY: 1
FORGETFULNESS: 1
LIMITED RANGE OF MOTION: 1
DENIES PAIN: 1

## 2023-12-26 ASSESSMENT — ACTIVITIES OF DAILY LIVING (ADL)
AMBULATION ASSISTANCE: ONE PERSON
PHYSICAL TRANSFERS ASSESSED: 1
AMBULATION ASSISTANCE: 1
CURRENT_FUNCTION: ONE PERSON
DRESSING_LB_CURRENT_FUNCTION: MODERATE ASSIST
MONEY MANAGEMENT (EXPENSES/BILLS): TOTALLY DEPENDENT

## 2023-12-27 PROCEDURE — 1090000002 HH PPS REVENUE DEBIT

## 2023-12-27 PROCEDURE — 1090000001 HH PPS REVENUE CREDIT

## 2023-12-28 PROCEDURE — 1090000001 HH PPS REVENUE CREDIT

## 2023-12-28 PROCEDURE — 1090000002 HH PPS REVENUE DEBIT

## 2023-12-29 ENCOUNTER — HOME CARE VISIT (OUTPATIENT)
Dept: HOME HEALTH SERVICES | Facility: HOME HEALTH | Age: 68
End: 2023-12-29
Payer: MEDICARE

## 2023-12-29 VITALS
TEMPERATURE: 98.1 F | OXYGEN SATURATION: 98 % | HEART RATE: 65 BPM | SYSTOLIC BLOOD PRESSURE: 142 MMHG | DIASTOLIC BLOOD PRESSURE: 70 MMHG | RESPIRATION RATE: 16 BRPM

## 2023-12-29 PROCEDURE — G0151 HHCP-SERV OF PT,EA 15 MIN: HCPCS | Mod: HHH

## 2023-12-29 PROCEDURE — 1090000002 HH PPS REVENUE DEBIT

## 2023-12-29 PROCEDURE — 1090000001 HH PPS REVENUE CREDIT

## 2023-12-29 SDOH — HEALTH STABILITY: PHYSICAL HEALTH: EXERCISE COMMENTS: SITTING ANKLE PUMPS, LAQ, HIP FLEXION 15 REPS  STANDING MARCH IN PLACE, KNEE FLEXION

## 2023-12-29 ASSESSMENT — ACTIVITIES OF DAILY LIVING (ADL)
AMBULATION ASSISTANCE: INDEPENDENT
AMBULATION_DISTANCE/DURATION_TOLERATED: 50
OASIS_M1830: 01
AMBULATION ASSISTANCE ON FLAT SURFACES: 1
HOME_HEALTH_OASIS: 00
AMBULATION ASSISTANCE: 1

## 2023-12-29 ASSESSMENT — ENCOUNTER SYMPTOMS
PERSON REPORTING PAIN: PATIENT
DENIES PAIN: 1
OCCASIONAL FEELINGS OF UNSTEADINESS: 0

## 2023-12-30 PROCEDURE — 1090000001 HH PPS REVENUE CREDIT

## 2023-12-30 PROCEDURE — 1090000002 HH PPS REVENUE DEBIT

## 2023-12-31 PROCEDURE — 1090000001 HH PPS REVENUE CREDIT

## 2023-12-31 PROCEDURE — 1090000002 HH PPS REVENUE DEBIT

## 2024-01-01 PROCEDURE — 1090000002 HH PPS REVENUE DEBIT

## 2024-01-01 PROCEDURE — 1090000001 HH PPS REVENUE CREDIT

## 2024-01-03 ENCOUNTER — TELEPHONE (OUTPATIENT)
Dept: PRIMARY CARE | Facility: CLINIC | Age: 69
End: 2024-01-03

## 2024-01-03 NOTE — TELEPHONE ENCOUNTER
Domitila back in September was last incontinence supply request/CMN. Given number for them- out of [place]

## 2024-01-03 NOTE — TELEPHONE ENCOUNTER
Patient's caregiver is calling and is wondering what company his undergarment order is coming from.Please Advise and call Cynthia [phone]. TM

## 2024-01-09 ENCOUNTER — PATIENT OUTREACH (OUTPATIENT)
Dept: PRIMARY CARE | Facility: CLINIC | Age: 69
End: 2024-01-09

## 2024-01-09 DIAGNOSIS — K21.9 GERD WITHOUT ESOPHAGITIS: Primary | ICD-10-CM

## 2024-01-09 DIAGNOSIS — M79.89 SWELLING OF RIGHT LOWER EXTREMITY: ICD-10-CM

## 2024-01-09 RX ORDER — MONTELUKAST SODIUM 4 MG/1
1 TABLET, CHEWABLE ORAL DAILY
Qty: 90 TABLET | Refills: 0 | Status: SHIPPED | OUTPATIENT
Start: 2024-01-09 | End: 2024-04-08

## 2024-01-09 RX ORDER — FUROSEMIDE 20 MG/1
TABLET ORAL
Qty: 30 TABLET | Refills: 0 | Status: SHIPPED | OUTPATIENT
Start: 2024-01-09 | End: 2024-02-09

## 2024-01-09 RX ORDER — PANTOPRAZOLE SODIUM 40 MG/1
TABLET, DELAYED RELEASE ORAL
Qty: 90 TABLET | Refills: 0 | Status: SHIPPED | OUTPATIENT
Start: 2024-01-09 | End: 2024-04-08

## 2024-01-09 RX ORDER — L. ACIDOPHILUS/LACTOBAC SPOR 35MM-25MM
TABLET ORAL
Qty: 30 TABLET | Refills: 0 | Status: SHIPPED | OUTPATIENT
Start: 2024-01-09 | End: 2024-02-09

## 2024-01-09 NOTE — PROGRESS NOTES
Unable to reach patient for one month post discharge follow up call.   LVM with call back number for patient to call if needed    no

## 2024-01-10 ENCOUNTER — HOSPITAL ENCOUNTER (OUTPATIENT)
Dept: VASCULAR MEDICINE | Facility: HOSPITAL | Age: 69
Discharge: HOME | End: 2024-01-10
Payer: MEDICARE

## 2024-01-10 DIAGNOSIS — M79.89 SWELLING OF RIGHT LOWER EXTREMITY: ICD-10-CM

## 2024-01-10 PROCEDURE — 93971 EXTREMITY STUDY: CPT

## 2024-01-10 PROCEDURE — 93971 EXTREMITY STUDY: CPT | Performed by: SURGERY

## 2024-01-19 ENCOUNTER — TELEPHONE (OUTPATIENT)
Dept: PRIMARY CARE | Facility: CLINIC | Age: 69
End: 2024-01-19
Payer: MEDICARE

## 2024-01-19 DIAGNOSIS — R26.89 IMPAIRED GAIT AND MOBILITY: Primary | ICD-10-CM

## 2024-01-19 NOTE — TELEPHONE ENCOUNTER
Cynthia Rothman from the [employer] is calling to get an order for a bariatric shower chair for patient.Please fax to Alisa Sosa Please Advise. FELIX Marie- [phone]  Fax [phone]

## 2024-01-22 DIAGNOSIS — R26.89 IMPAIRED GAIT AND MOBILITY: Primary | ICD-10-CM

## 2024-02-07 NOTE — TELEPHONE ENCOUNTER
Cynthia his care giver is calling to say to shower chair arrive today and it is too small. They did take the chair back He needs a Shower bench ordered.  [facility] Alisa Garcia Fax: [phone]    Her/She

## 2024-02-08 DIAGNOSIS — J30.9 ALLERGIC RHINITIS, UNSPECIFIED SEASONALITY, UNSPECIFIED TRIGGER: Primary | ICD-10-CM

## 2024-02-08 DIAGNOSIS — E11.49 DIABETES MELLITUS TYPE 2 WITH NEUROLOGICAL MANIFESTATIONS (MULTI): ICD-10-CM

## 2024-02-08 DIAGNOSIS — M79.89 SWELLING OF RIGHT LOWER EXTREMITY: ICD-10-CM

## 2024-02-09 RX ORDER — FUROSEMIDE 20 MG/1
TABLET ORAL
Qty: 30 TABLET | Refills: 5 | Status: SHIPPED | OUTPATIENT
Start: 2024-02-09

## 2024-02-09 RX ORDER — CETIRIZINE HYDROCHLORIDE 10 MG/1
TABLET ORAL
Qty: 90 TABLET | Refills: 5 | Status: SHIPPED | OUTPATIENT
Start: 2024-02-09

## 2024-02-09 RX ORDER — NAPROXEN SODIUM 220 MG/1
TABLET, FILM COATED ORAL
Qty: 30 TABLET | Refills: 5 | Status: SHIPPED | OUTPATIENT
Start: 2024-02-09 | End: 2024-02-28 | Stop reason: ALTCHOICE

## 2024-02-09 RX ORDER — L. ACIDOPHILUS/LACTOBAC SPOR 35MM-25MM
TABLET ORAL
Qty: 30 TABLET | Refills: 5 | Status: SHIPPED | OUTPATIENT
Start: 2024-02-09

## 2024-02-19 PROBLEM — R39.198 ABNORMAL URINATION: Status: RESOLVED | Noted: 2023-09-07 | Resolved: 2024-02-19

## 2024-02-19 PROBLEM — E87.20 LACTIC ACIDOSIS: Status: RESOLVED | Noted: 2023-09-07 | Resolved: 2024-02-19

## 2024-02-19 PROBLEM — L03.119 CELLULITIS OF FOOT: Status: RESOLVED | Noted: 2023-12-08 | Resolved: 2024-02-19

## 2024-02-19 PROBLEM — M25.462: Status: RESOLVED | Noted: 2023-09-07 | Resolved: 2024-02-19

## 2024-02-19 PROBLEM — F41.9 ANXIETY: Status: RESOLVED | Noted: 2023-09-07 | Resolved: 2024-02-19

## 2024-02-19 PROBLEM — R33.9 BLADDER RETENTION OF URINE: Status: RESOLVED | Noted: 2023-09-07 | Resolved: 2024-02-19

## 2024-02-19 PROBLEM — S93.314A DISLOCATION OF RIGHT SUBTALAR JOINT: Status: RESOLVED | Noted: 2023-09-07 | Resolved: 2024-02-19

## 2024-02-19 PROBLEM — R76.8 POSITIVE GAD ANTIBODY: Status: RESOLVED | Noted: 2023-09-07 | Resolved: 2024-02-19

## 2024-02-19 PROBLEM — K92.2 GI BLEED: Status: RESOLVED | Noted: 2023-09-07 | Resolved: 2024-02-19

## 2024-02-19 PROBLEM — E11.9 TYPE 2 DIABETES MELLITUS WITHOUT COMPLICATION (MULTI): Status: RESOLVED | Noted: 2023-09-07 | Resolved: 2024-02-19

## 2024-02-19 PROBLEM — N40.0 ENLARGED PROSTATE WITHOUT LOWER URINARY TRACT SYMPTOMS (LUTS): Status: RESOLVED | Noted: 2023-09-07 | Resolved: 2024-02-19

## 2024-02-21 ENCOUNTER — HOSPITAL ENCOUNTER (EMERGENCY)
Facility: HOSPITAL | Age: 69
Discharge: HOME | End: 2024-02-21
Attending: EMERGENCY MEDICINE
Payer: COMMERCIAL

## 2024-02-21 ENCOUNTER — APPOINTMENT (OUTPATIENT)
Dept: RADIOLOGY | Facility: HOSPITAL | Age: 69
End: 2024-02-21
Payer: COMMERCIAL

## 2024-02-21 VITALS
OXYGEN SATURATION: 97 % | WEIGHT: 275.57 LBS | TEMPERATURE: 97.7 F | BODY MASS INDEX: 41.77 KG/M2 | RESPIRATION RATE: 16 BRPM | HEART RATE: 61 BPM | SYSTOLIC BLOOD PRESSURE: 160 MMHG | HEIGHT: 68 IN | DIASTOLIC BLOOD PRESSURE: 93 MMHG

## 2024-02-21 DIAGNOSIS — M79.605 PAIN OF LEFT LOWER EXTREMITY: Primary | ICD-10-CM

## 2024-02-21 PROCEDURE — 99284 EMERGENCY DEPT VISIT MOD MDM: CPT | Mod: 25

## 2024-02-21 PROCEDURE — 2500000001 HC RX 250 WO HCPCS SELF ADMINISTERED DRUGS (ALT 637 FOR MEDICARE OP): Performed by: EMERGENCY MEDICINE

## 2024-02-21 PROCEDURE — 93971 EXTREMITY STUDY: CPT | Performed by: RADIOLOGY

## 2024-02-21 PROCEDURE — 93971 EXTREMITY STUDY: CPT

## 2024-02-21 RX ORDER — TRAMADOL HYDROCHLORIDE 50 MG/1
50 TABLET ORAL EVERY 6 HOURS PRN
Qty: 12 TABLET | Refills: 0 | Status: SHIPPED | OUTPATIENT
Start: 2024-02-21 | End: 2024-02-28 | Stop reason: WASHOUT

## 2024-02-21 RX ORDER — METHOCARBAMOL 500 MG/1
500 TABLET, FILM COATED ORAL 4 TIMES DAILY
Qty: 40 TABLET | Refills: 0 | Status: SHIPPED | OUTPATIENT
Start: 2024-02-21 | End: 2024-07-08 | Stop reason: WASHOUT

## 2024-02-21 RX ORDER — TRAMADOL HYDROCHLORIDE 50 MG/1
50 TABLET ORAL ONCE
Status: COMPLETED | OUTPATIENT
Start: 2024-02-21 | End: 2024-02-21

## 2024-02-21 RX ADMIN — TRAMADOL HYDROCHLORIDE 50 MG: 50 TABLET, COATED ORAL at 13:37

## 2024-02-21 ASSESSMENT — PAIN DESCRIPTION - LOCATION: LOCATION: LEG

## 2024-02-21 ASSESSMENT — LIFESTYLE VARIABLES
HAVE PEOPLE ANNOYED YOU BY CRITICIZING YOUR DRINKING: NO
EVER HAD A DRINK FIRST THING IN THE MORNING TO STEADY YOUR NERVES TO GET RID OF A HANGOVER: NO
EVER FELT BAD OR GUILTY ABOUT YOUR DRINKING: NO
HAVE YOU EVER FELT YOU SHOULD CUT DOWN ON YOUR DRINKING: NO

## 2024-02-21 ASSESSMENT — COLUMBIA-SUICIDE SEVERITY RATING SCALE - C-SSRS
1. IN THE PAST MONTH, HAVE YOU WISHED YOU WERE DEAD OR WISHED YOU COULD GO TO SLEEP AND NOT WAKE UP?: NO
2. HAVE YOU ACTUALLY HAD ANY THOUGHTS OF KILLING YOURSELF?: NO
6. HAVE YOU EVER DONE ANYTHING, STARTED TO DO ANYTHING, OR PREPARED TO DO ANYTHING TO END YOUR LIFE?: NO

## 2024-02-21 ASSESSMENT — PAIN DESCRIPTION - DESCRIPTORS
DESCRIPTORS: OTHER (COMMENT)
DESCRIPTORS: SQUEEZING

## 2024-02-21 ASSESSMENT — PAIN DESCRIPTION - FREQUENCY: FREQUENCY: CONSTANT/CONTINUOUS

## 2024-02-21 ASSESSMENT — PAIN - FUNCTIONAL ASSESSMENT: PAIN_FUNCTIONAL_ASSESSMENT: 0-10

## 2024-02-21 ASSESSMENT — PAIN DESCRIPTION - ONSET: ONSET: ONGOING

## 2024-02-21 ASSESSMENT — PAIN DESCRIPTION - ORIENTATION: ORIENTATION: LEFT

## 2024-02-21 ASSESSMENT — PAIN DESCRIPTION - PAIN TYPE: TYPE: ACUTE PAIN

## 2024-02-21 ASSESSMENT — PAIN SCALES - GENERAL: PAINLEVEL_OUTOF10: 10 - WORST POSSIBLE PAIN

## 2024-02-21 NOTE — ED PROVIDER NOTES
"HPI   Chief Complaint   Patient presents with    Leg Pain       68m with T2DM, HTN, GERD, COPD, ANGLE, hx LLE DVT, hard of hearing presents for 1 week of LLE throbbing \"vice\" like pain that comes and goes. He says there is some sensory change to it also and it is harder to move due to pain. Pt is here with his caregiver Cynthia. Denies recent trauma to leg. He has hx of left hip surgery and normally cannot flex left hip well. He used to take Eliquis for LLE DVT diagnosed 12/2022 but stopped last year. Is taking ASA.   Denies fever, CP, chills, abd pain, N/V/D or other complaints.                             [place] Coma Scale Score: 15                     Patient History   Past Medical History:   Diagnosis Date    BPH (benign prostatic hyperplasia)     Chronic dislocation of left shoulder     COPD (chronic obstructive pulmonary disease) (CMS/HCC)     Diabetes mellitus (CMS/HCC)     Dislocation of right subtalar joint 09/07/2023    Enlarged prostate without lower urinary tract symptoms (luts) 09/07/2023    Gastric ulcer     GERD (gastroesophageal reflux disease)     Hard of hearing     HLD (hyperlipidemia)     Insomnia     Left humeral fracture 09/07/2023    Multiple rib fractures 09/07/2023    ANGLE (obstructive sleep apnea)     Seizure (CMS/[facility])     Trochanteric fracture of left femur (CMS/[facility]) 09/07/2023     Past Surgical History:   Procedure Laterality Date    BILATERAL VATS ABLATION      CT GUIDED PERCUTANEOUS BIOPSY LUNG  08/08/2012    CT GUIDED PERCUTANEOUS BIOPSY LUNG 8/8/2012 [facility] LEGACY    ESOPHAGOGASTRODUODENOSCOPY      GASTRIC BYPASS      ORIF FEMUR FRACTURE      SHOULDER SURGERY       Family History   Problem Relation Name Age of Onset    Stroke Mother      Aneurysm Father       Social History     Tobacco Use    Smoking status: Never    Smokeless tobacco: Never   Substance Use Topics    Alcohol use: Not Currently    Drug use: Not Currently       Physical Exam   ED Triage Vitals [02/21/24 1156]   Temperature " Heart Rate Respirations BP   36.5 °C (97.7 °F) 61 16 (!) 160/93      Pulse Ox Temp Source Heart Rate Source Patient Position   97 % Temporal Monitor Lying      BP Location FiO2 (%)     Right arm --       Physical Exam  Vitals reviewed.   Constitutional:       Comments: Hard of hearing.    HENT:      Head: Normocephalic and atraumatic.   Eyes:      Extraocular Movements: Extraocular movements intact.      Conjunctiva/sclera: Conjunctivae normal.   Cardiovascular:      Rate and Rhythm: Normal rate and regular rhythm.      Heart sounds: No murmur heard.     No friction rub. No gallop.   Pulmonary:      Effort: Pulmonary effort is normal.      Breath sounds: Normal breath sounds. No wheezing, rhonchi or rales.   Abdominal:      General: Bowel sounds are normal.      Palpations: Abdomen is soft. There is no mass.      Tenderness: There is no abdominal tenderness. There is no guarding or rebound.   Musculoskeletal:         General: Tenderness present.      Cervical back: Neck supple.      Comments: Left leg tender below knee. Mild edema compared to R.   2+ DP pulses to both feet.      Skin:     General: Skin is warm and dry.      Capillary Refill: Capillary refill takes less than 2 seconds.      Findings: No bruising, erythema or rash.   Neurological:      Mental Status: He is alert. Mental status is at baseline.      Comments: Answering questions, following commands. Moving all extremities.    Psychiatric:         Mood and Affect: Mood and affect normal.         ED Course & MDM   Diagnoses as of 02/21/24 1328   Pain of left lower extremity       Medical Decision Making  Ddx DVT, cellulitis, acute limb ischemia, MSK pain   Vitals 160/93 afebrile otherwise stable on room air. On exam limbs are equal in color and hue, 2+ dp pulses to both feet. No significant erythema to LLE. DVT exam to LLE negative. No recent trauma, no LLE xray indicated.   Will be discharged with short robaxin and ultram course. Discussed with pt and he  is agreeable with discharge and follow up with PCP.     Amount and/or Complexity of Data Reviewed  Independent Historian: guardian  Radiology: ordered.        Procedure  Procedures     Vannessa Blanco DO  Resident  02/21/24 1842

## 2024-02-28 ENCOUNTER — OFFICE VISIT (OUTPATIENT)
Dept: PRIMARY CARE | Facility: CLINIC | Age: 69
End: 2024-02-28
Payer: MEDICARE

## 2024-02-28 VITALS
OXYGEN SATURATION: 98 % | SYSTOLIC BLOOD PRESSURE: 128 MMHG | HEART RATE: 56 BPM | DIASTOLIC BLOOD PRESSURE: 82 MMHG | WEIGHT: 237 LBS | BODY MASS INDEX: 36.04 KG/M2

## 2024-02-28 DIAGNOSIS — E66.01 OBESITY, MORBID (MULTI): ICD-10-CM

## 2024-02-28 DIAGNOSIS — I10 BENIGN ESSENTIAL HYPERTENSION: Primary | ICD-10-CM

## 2024-02-28 DIAGNOSIS — Z23 ENCOUNTER FOR IMMUNIZATION: ICD-10-CM

## 2024-02-28 DIAGNOSIS — Z12.11 SCREEN FOR COLON CANCER: ICD-10-CM

## 2024-02-28 DIAGNOSIS — E11.49 DIABETES MELLITUS TYPE 2 WITH NEUROLOGICAL MANIFESTATIONS (MULTI): ICD-10-CM

## 2024-02-28 PROBLEM — G40.909 SEIZURE DISORDER (MULTI): Status: RESOLVED | Noted: 2023-09-07 | Resolved: 2024-02-28

## 2024-02-28 PROBLEM — G40.909 NONINTRACTABLE EPILEPSY WITHOUT STATUS EPILEPTICUS (MULTI): Status: RESOLVED | Noted: 2023-09-07 | Resolved: 2024-02-28

## 2024-02-28 PROBLEM — I73.9 PVD (PERIPHERAL VASCULAR DISEASE) (CMS-HCC): Status: RESOLVED | Noted: 2023-09-07 | Resolved: 2024-02-28

## 2024-02-28 LAB — POC HEMOGLOBIN A1C: 6 % (ref 4.2–6.5)

## 2024-02-28 PROCEDURE — 1160F RVW MEDS BY RX/DR IN RCRD: CPT | Performed by: FAMILY MEDICINE

## 2024-02-28 PROCEDURE — 1126F AMNT PAIN NOTED NONE PRSNT: CPT | Performed by: FAMILY MEDICINE

## 2024-02-28 PROCEDURE — 1159F MED LIST DOCD IN RCRD: CPT | Performed by: FAMILY MEDICINE

## 2024-02-28 PROCEDURE — 99214 OFFICE O/P EST MOD 30 MIN: CPT | Mod: 25 | Performed by: FAMILY MEDICINE

## 2024-02-28 PROCEDURE — 90677 PCV20 VACCINE IM: CPT | Performed by: FAMILY MEDICINE

## 2024-02-28 PROCEDURE — 4010F ACE/ARB THERAPY RXD/TAKEN: CPT | Performed by: FAMILY MEDICINE

## 2024-02-28 PROCEDURE — 3079F DIAST BP 80-89 MM HG: CPT | Performed by: FAMILY MEDICINE

## 2024-02-28 PROCEDURE — 1157F ADVNC CARE PLAN IN RCRD: CPT | Performed by: FAMILY MEDICINE

## 2024-02-28 PROCEDURE — 3074F SYST BP LT 130 MM HG: CPT | Performed by: FAMILY MEDICINE

## 2024-02-28 PROCEDURE — 99214 OFFICE O/P EST MOD 30 MIN: CPT | Performed by: FAMILY MEDICINE

## 2024-02-28 PROCEDURE — 83036 HEMOGLOBIN GLYCOSYLATED A1C: CPT | Performed by: FAMILY MEDICINE

## 2024-02-28 PROCEDURE — 1036F TOBACCO NON-USER: CPT | Performed by: FAMILY MEDICINE

## 2024-02-28 RX ORDER — ARIPIPRAZOLE 5 MG/1
5 TABLET ORAL NIGHTLY
COMMUNITY
Start: 2024-02-09

## 2024-02-28 ASSESSMENT — ENCOUNTER SYMPTOMS
DEPRESSION: 0
OCCASIONAL FEELINGS OF UNSTEADINESS: 1
LOSS OF SENSATION IN FEET: 1

## 2024-02-28 ASSESSMENT — PATIENT HEALTH QUESTIONNAIRE - PHQ9
2. FEELING DOWN, DEPRESSED OR HOPELESS: NOT AT ALL
SUM OF ALL RESPONSES TO PHQ9 QUESTIONS 1 & 2: 0
1. LITTLE INTEREST OR PLEASURE IN DOING THINGS: NOT AT ALL

## 2024-02-28 ASSESSMENT — PAIN SCALES - GENERAL: PAINLEVEL: 0-NO PAIN

## 2024-02-28 NOTE — PATIENT INSTRUCTIONS
Here for follow up    Diabetes - A1c is 6 - well controlled.  Diet controlled.  Due to diabetic eye exam    For blood pressure - well controlled on lisinopril.     Pt is due for colorectal cancer screening. Discussed various options. Recommend cologuard testing.  If cologuard is positive we will refer for colonoscopy    Prevnar 20 given today    Follow up in 4 months.

## 2024-02-28 NOTE — PROGRESS NOTES
Subjective   Patient ID: Shon Boyd Jr. is a 68 y.o. male who presents for Follow-up.    Patient is here for follow up.  Pt would to have ears checked.      Semi-retired.  Patient be going to senior center.  Walking with walker.     Leg is improved.  No swelling.      For DM2:  -A1c: 6  -Follow up: Doing well.    -Hypoglycemic Episodes: none  -Glucose monitoring:  none      Hypertension  -Patient is here for follow-up of elevated blood pressure.   -Blood pressure is well controlled.  -Cardiac symptoms: none.   -Patient denies fatigue and irregular heart beat.   -Cardiologist:           Review of Systems    Objective   /82 (BP Location: Right arm, Patient Position: Sitting)   Pulse 56   Wt 108 kg (237 lb) Comment: Wheelchair  SpO2 98%   BMI 36.04 kg/m²     Physical Exam  Vitals reviewed.   Constitutional:       General: He is not in acute distress.     Appearance: He is obese.   Cardiovascular:      Rate and Rhythm: Normal rate and regular rhythm.   Pulmonary:      Effort: Pulmonary effort is normal.      Breath sounds: No wheezing or rhonchi.   Musculoskeletal:      Right lower leg: No edema.      Left lower leg: No edema.      Comments: Wheelchair   Lymphadenopathy:      Cervical: No cervical adenopathy.   Neurological:      Mental Status: He is alert.         Assessment/Plan   Diagnoses and all orders for this visit:  Benign essential hypertension  Diabetes mellitus type 2 with neurological manifestations (CMS/HCC)  -     POCT glycosylated hemoglobin (Hb A1C) manually resulted  Screen for colon cancer  -     Cologuard® colon cancer screening; Future  Obesity, morbid (CMS/HCC)  Encounter for immunization  Other orders  -     Pneumococcal conjugate vaccine, 20-valent (PREVNAR 20)    Patient Instructions   Here for follow up    Diabetes - A1c is 6 - well controlled.  Diet controlled.  Due to diabetic eye exam    For blood pressure - well controlled on lisinopril.     Pt is due for colorectal cancer  screening. Discussed various options. Recommend cologuard testing.  If cologuard is positive we will refer for colonoscopy    Prevnar 20 given today    Follow up in 4 months.

## 2024-03-01 ENCOUNTER — TELEPHONE (OUTPATIENT)
Dept: PRIMARY CARE | Facility: CLINIC | Age: 69
End: 2024-03-01
Payer: MEDICARE

## 2024-03-08 ENCOUNTER — PATIENT OUTREACH (OUTPATIENT)
Dept: PRIMARY CARE | Facility: CLINIC | Age: 69
End: 2024-03-08
Payer: MEDICARE

## 2024-03-13 DIAGNOSIS — N40.1 BENIGN NODULAR PROSTATIC HYPERPLASIA WITH LOWER URINARY TRACT SYMPTOMS: Primary | ICD-10-CM

## 2024-03-13 RX ORDER — FINASTERIDE 5 MG/1
TABLET, FILM COATED ORAL
Qty: 90 TABLET | Refills: 1 | Status: SHIPPED | OUTPATIENT
Start: 2024-03-13

## 2024-03-14 DIAGNOSIS — I10 BENIGN ESSENTIAL HYPERTENSION: Primary | ICD-10-CM

## 2024-03-14 RX ORDER — LISINOPRIL 5 MG/1
5 TABLET ORAL EVERY 24 HOURS
Qty: 90 TABLET | Refills: 3 | Status: SHIPPED | OUTPATIENT
Start: 2024-03-14

## 2024-03-14 NOTE — TELEPHONE ENCOUNTER
Requested Prescriptions     Pending Prescriptions Disp Refills    lisinopril 5 mg tablet 260 tablet 0     Sig: Take 1 tablet (5 mg) by mouth once every 24 hours.

## 2024-03-15 DIAGNOSIS — Z96.649 PERIPROSTHETIC FRACTURE OF SHAFT OF FEMUR: ICD-10-CM

## 2024-03-15 DIAGNOSIS — M97.8XXA PERIPROSTHETIC FRACTURE OF SHAFT OF FEMUR: ICD-10-CM

## 2024-03-15 RX ORDER — MULTIVITAMIN
1 TABLET ORAL 2 TIMES DAILY
Qty: 180 TABLET | Refills: 0 | Status: SHIPPED | OUTPATIENT
Start: 2024-03-15 | End: 2024-06-06

## 2024-04-08 DIAGNOSIS — K21.9 GERD WITHOUT ESOPHAGITIS: ICD-10-CM

## 2024-04-08 RX ORDER — MONTELUKAST SODIUM 4 MG/1
1 TABLET, CHEWABLE ORAL DAILY
Qty: 90 TABLET | Refills: 0 | Status: SHIPPED | OUTPATIENT
Start: 2024-04-08

## 2024-04-08 RX ORDER — PANTOPRAZOLE SODIUM 40 MG/1
TABLET, DELAYED RELEASE ORAL
Qty: 90 TABLET | Refills: 0 | Status: SHIPPED | OUTPATIENT
Start: 2024-04-08

## 2024-04-12 ENCOUNTER — TELEPHONE (OUTPATIENT)
Dept: PRIMARY CARE | Facility: CLINIC | Age: 69
End: 2024-04-12
Payer: MEDICARE

## 2024-04-12 DIAGNOSIS — E11.49 DIABETES MELLITUS TYPE 2 WITH NEUROLOGICAL MANIFESTATIONS (MULTI): Primary | ICD-10-CM

## 2024-04-12 NOTE — TELEPHONE ENCOUNTER
Has questions on these two medications as they don't have them on his med list. Lactobacillus 1 million TB and baby Asprin 81 mg. They needed to make sure he is to be taking these, if so can you fax over a script/order.     Fax: [phone]

## 2024-04-15 RX ORDER — ASPIRIN 81 MG/1
81 TABLET ORAL DAILY
Qty: 90 TABLET | Refills: 3 | Status: SHIPPED | OUTPATIENT
Start: 2024-04-15 | End: 2025-04-15

## 2024-04-15 NOTE — TELEPHONE ENCOUNTER
Spoke with Cynthia and she requested copy of script for their records.  Could you print script when you get a moment so I can fax it to the # in the message?

## 2024-04-24 ENCOUNTER — OFFICE VISIT (OUTPATIENT)
Dept: ORTHOPEDIC SURGERY | Facility: CLINIC | Age: 69
End: 2024-04-24
Payer: MEDICARE

## 2024-04-24 ENCOUNTER — APPOINTMENT (OUTPATIENT)
Dept: RADIOLOGY | Facility: CLINIC | Age: 69
End: 2024-04-24
Payer: MEDICARE

## 2024-04-24 ENCOUNTER — HOSPITAL ENCOUNTER (OUTPATIENT)
Dept: RADIOLOGY | Facility: CLINIC | Age: 69
Discharge: HOME | End: 2024-04-24
Payer: MEDICARE

## 2024-04-24 DIAGNOSIS — G89.29 CHRONIC KNEE PAIN, UNSPECIFIED LATERALITY: ICD-10-CM

## 2024-04-24 DIAGNOSIS — S72.009D CLOSED FRACTURE OF HIP WITH ROUTINE HEALING, UNSPECIFIED LATERALITY, SUBSEQUENT ENCOUNTER: ICD-10-CM

## 2024-04-24 DIAGNOSIS — M25.569 CHRONIC KNEE PAIN, UNSPECIFIED LATERALITY: ICD-10-CM

## 2024-04-24 PROCEDURE — 1036F TOBACCO NON-USER: CPT | Performed by: ORTHOPAEDIC SURGERY

## 2024-04-24 PROCEDURE — 1157F ADVNC CARE PLAN IN RCRD: CPT | Performed by: ORTHOPAEDIC SURGERY

## 2024-04-24 PROCEDURE — 72170 X-RAY EXAM OF PELVIS: CPT | Mod: LEFT SIDE | Performed by: STUDENT IN AN ORGANIZED HEALTH CARE EDUCATION/TRAINING PROGRAM

## 2024-04-24 PROCEDURE — 99213 OFFICE O/P EST LOW 20 MIN: CPT | Performed by: ORTHOPAEDIC SURGERY

## 2024-04-24 PROCEDURE — 1160F RVW MEDS BY RX/DR IN RCRD: CPT | Performed by: ORTHOPAEDIC SURGERY

## 2024-04-24 PROCEDURE — 4010F ACE/ARB THERAPY RXD/TAKEN: CPT | Performed by: ORTHOPAEDIC SURGERY

## 2024-04-24 PROCEDURE — 72170 X-RAY EXAM OF PELVIS: CPT

## 2024-04-24 PROCEDURE — 73552 X-RAY EXAM OF FEMUR 2/>: CPT | Mod: LEFT SIDE | Performed by: STUDENT IN AN ORGANIZED HEALTH CARE EDUCATION/TRAINING PROGRAM

## 2024-04-24 PROCEDURE — 73552 X-RAY EXAM OF FEMUR 2/>: CPT | Mod: LT

## 2024-04-24 PROCEDURE — 1159F MED LIST DOCD IN RCRD: CPT | Performed by: ORTHOPAEDIC SURGERY

## 2024-04-24 NOTE — PROGRESS NOTES
Shon Boyd Jr. is post-op from removal of hardware and intramedullary nail left femur fracture December 2023 he is doing well at this point.   Denies fevers or chills.  Denies drainage from the wound.  he reports no additional symptoms or concerns. No shortness of breath or chest pain. No calf swelling or pain.  He mostly is using a walker and a wheelchair.  He lives in a assisted living.    The patients full medical history, surgical history, medications, allergies, family, medical history, social history, and a complete 14 point review of systems is documented in the medical record on the signed, scanned medical intake sheet or reviewed in the history of present illness. Review of systems otherwise negative    Past Medical History:   Diagnosis Date    BPH (benign prostatic hyperplasia)     Chronic dislocation of left shoulder     COPD (chronic obstructive pulmonary disease) (Multi)     Diabetes mellitus (Multi)     Dislocation of right subtalar joint 09/07/2023    Enlarged prostate without lower urinary tract symptoms (luts) 09/07/2023    Gastric ulcer     GERD (gastroesophageal reflux disease)     Hard of hearing     HLD (hyperlipidemia)     Insomnia     Left humeral fracture 09/07/2023    Multiple rib fractures 09/07/2023    ANGLE (obstructive sleep apnea)     Seizure (Multi)     Trochanteric fracture of left femur (Multi) 09/07/2023       Medication Documentation Review Audit       Reviewed by Thony Polo MA (Medical Assistant) on 04/24/24 at 0832      Medication Order Taking? Sig Documenting Provider Last Dose Status   acidophilus-sporogenes (Acidophilus Ex Str, L. sporog,) 35 million- 25 million cell tablet [id number] No GIVE 1 TABLET BY MOUTH ONCE DAILY Sanket Delacruz, DO Taking Active   ARIPiprazole (Abilify) 5 mg tablet [id number] No Take 1 tablet (5 mg) by mouth once daily at bedtime. Historical Provider, MD Taking Active   aspirin 81 mg EC tablet [id number] No Take 1 tablet (81 mg) by  mouth once daily. Daniel Bañuelos MD Taking Active   aspirin 81 mg EC tablet [id number]  Take 1 tablet (81 mg) by mouth once daily. Sanket Delacruz DO  Active   busPIRone (Buspar) 10 mg tablet [id number] No Take 1 tablet (10 mg) by mouth every 8 hours. Daniel Bañuelos MD Taking Active   calcium carbonate-vitamin D3 600 mg-10 mcg (400 unit) tablet [id number]  Take 1 tablet by mouth 2 times a day. Sanket Delacruz DO  Active   cetirizine (ZyrTEC) 10 mg tablet [id number] No GIVE 1 TABLET BY MOUTH ONCE DAILY Sanket Delacruz DO Taking Active   colestipol (Colestid) 1 gram tablet [id number]  TAKE 1 TABLET BY MOUTH ONCE DAILY Sanket Delacruz DO  Active   finasteride (Proscar) 5 mg tablet [id number]  GIVE 1 TABLET BY MOUTH ONCE DAILY Sanket Delacruz DO  Active   fluticasone (Flonase) 50 mcg/actuation nasal spray [id number] No Administer 2 sprays into each nostril once daily. Daniel Bañuelos MD Taking Active   furosemide (Lasix) 20 mg tablet [id number] No GIVE 1 TABLET BY MOUTH ONCE DAILY Sanket Delacruz DO Taking Active   lisinopril 5 mg tablet [id number]  Take 1 tablet (5 mg) by mouth once every 24 hours. Sanket Delacruz DO  Active   melatonin 10 mg capsule [id number] No Take 1 capsule (10 mg) by mouth once daily at bedtime. Daniel Bañuelos MD Taking Active   methocarbamol (Robaxin) 500 mg tablet [id number] No Take 1 tablet (500 mg) by mouth 4 times a day for 10 days. Candi Velasco,  Taking  24 0395   oxybutynin (Ditropan) 5 mg tablet [id number] No Take 1 tablet (5 mg) by mouth 3 times a day. Daniel Bañuelos MD Taking Active   pantoprazole (ProtoNix) 40 mg EC tablet [id number]  GIVE 1 TABLET BY MOUTH ONCE DAILY Sanket Delacruz DO  Active   phenytoin ER (Dilantin) 100 mg capsule [id number] No Take 2 capsules (200 mg) by mouth 3 times a day. Daniel Bañuelos MD Taking Active   traZODone (Desyrel) 50 mg tablet [id number] No Take 1 tablet (50 mg) by mouth once every 24 hours.  Historical Provider, MD Taking Active                    No Known Allergies    Social History     Socioeconomic History    Marital status:      Spouse name: Not on file    Number of children: Not on file    Years of education: Not on file    Highest education level: Not on file   Occupational History    Not on file   Tobacco Use    Smoking status: Never    Smokeless tobacco: Never   Substance and Sexual Activity    Alcohol use: Not Currently    Drug use: Not Currently    Sexual activity: Defer   Other Topics Concern    Not on file   Social History Narrative    Not on file     Social Determinants of Health     Financial Resource Strain: Low Risk  (12/8/2023)    Overall Financial Resource Strain (CARDIA)     Difficulty of Paying Living Expenses: Not hard at all   Food Insecurity: Not on File (5/22/2021)    Received from [facility]     Food Insecurity     Food: 0   Transportation Needs: No Transportation Needs (12/29/2023)    OASIS : Transportation     Lack of Transportation (Medical): No     Lack of Transportation (Non-Medical): No     Patient Unable or Declines to Respond: No   Physical Activity: Not on File (5/22/2021)    Received from [facility]     Physical Activity     Physical Activity: 0   Stress: Not on File (5/22/2021)    Received from [facility]     Stress     Stress: 0   Social Connections: Feeling Socially Integrated (12/29/2023)    OASIS : Social Isolation     Frequency of experiencing loneliness or isolation: Never   Intimate Partner Violence: Not on file   Housing Stability: Low Risk  (12/8/2023)    Housing Stability Vital Sign     Unable to Pay for Housing in the Last Year: No     Number of Places Lived in the Last Year: 1     Unstable Housing in the Last Year: No       Past Surgical History:   Procedure Laterality Date    BILATERAL VATS ABLATION      CT GUIDED PERCUTANEOUS BIOPSY LUNG  08/08/2012    CT GUIDED PERCUTANEOUS BIOPSY LUNG 8/8/2012 [facility]GAC[facility]    ESOPHAGOGASTRODUODENOSCOPY      GASTRIC  BYPASS      ORIF FEMUR FRACTURE      SHOULDER SURGERY         Gen: The patient is alert and oriented ×3, is in no acute distress, and appear their stated age and weight.    Psychiatric: Mood and affect are appropriate.    Eyes: Sclera are white, and pupils are round and symmetric.    ENT: Mucous membranes are moist.     Neck: Supple. Thyroid is midline.    Respiratory: Respirations are nonlabored, chest rise is symmetric.    Cardiac: Rate is regular by palpation of distal pulses.     Abdomen: Nondistended.    Integument: No obvious cutaneous lesions are noted. No signs of lymphangitis. No signs of systemic edema.  side: left lower extremity :  his  surgical incisions are healing well, without evidence of erythema, fluctuance, drainage, or infection.  The skin around the incision is intact.  Distally neurovascular exam is stable.  There is appropriate tenderness to palpation in the sonja-incisional area. No calf swelling or tenderness to palpation.      I personally reviewed multiple views of left femur were obtained in the office today demonstrate maintenance of reduction, interval healing, and a stable position of the hardware.  There is one fracture line medial and posterior that has some incomplete healing but anterior and lateral.  Complete healing most likely part of a butterfly fragment.      Shon Boyd Jr. is a 68 y.o. patient status post left femur intramedullary nail and removal of hardware December 2022 I went over his x-rays in detail today. he is WBAT of the side: leftlower extremity. ~He/she~ is range of motion as tolerated of the side: left lower extremity.  I stressed the importance of physical therapy on overall functional outcome.  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  He would like to the need to get a total hip arthroplasty in order to alleviate this pain.  The posterior  buttock pain just like the from sciatica as it is very sporadic and shoots down his leg.  He is very low demand and is using a walker at baseline.  There is no evidence of hardware loosening or dynamization and he does not really have any thigh pain to indicate that he does incomplete healing of his fracture. I answered all patient's questions he agrees with treatment plan.  I will see him back in Follow-up as necessary with repeat AP pelvis and 2 views of the left femur.  Clemente Renteria    Department of Orthopaedic Trauma Surgery

## 2024-05-03 ENCOUNTER — TELEPHONE (OUTPATIENT)
Dept: PRIMARY CARE | Facility: CLINIC | Age: 69
End: 2024-05-03
Payer: MEDICARE

## 2024-05-03 NOTE — TELEPHONE ENCOUNTER
Radha calling to get prescription for bed rails for patient.  Patient has a new bed and needs bed rails.  Please call & advise.  Thank you

## 2024-05-30 DIAGNOSIS — J30.9 ALLERGIC RHINITIS, UNSPECIFIED SEASONALITY, UNSPECIFIED TRIGGER: Primary | ICD-10-CM

## 2024-05-30 RX ORDER — FLUTICASONE PROPIONATE 50 MCG
2 SPRAY, SUSPENSION (ML) NASAL DAILY
Qty: 16 G | Refills: 2 | Status: SHIPPED | OUTPATIENT
Start: 2024-05-30

## 2024-06-06 DIAGNOSIS — Z96.649 PERIPROSTHETIC FRACTURE OF SHAFT OF FEMUR: ICD-10-CM

## 2024-06-06 DIAGNOSIS — G40.909 SEIZURE DISORDER (MULTI): Primary | ICD-10-CM

## 2024-06-06 DIAGNOSIS — M97.8XXA PERIPROSTHETIC FRACTURE OF SHAFT OF FEMUR: ICD-10-CM

## 2024-06-06 RX ORDER — PHENYTOIN SODIUM 100 MG/1
CAPSULE, EXTENDED RELEASE ORAL
Qty: 180 CAPSULE | Refills: 4 | Status: SHIPPED | OUTPATIENT
Start: 2024-06-06

## 2024-06-06 RX ORDER — MULTIVITAMIN
TABLET ORAL
Qty: 180 TABLET | Refills: 0 | Status: SHIPPED | OUTPATIENT
Start: 2024-06-06

## 2024-07-08 ENCOUNTER — OFFICE VISIT (OUTPATIENT)
Dept: PRIMARY CARE | Facility: CLINIC | Age: 69
End: 2024-07-08
Payer: COMMERCIAL

## 2024-07-08 VITALS
SYSTOLIC BLOOD PRESSURE: 132 MMHG | OXYGEN SATURATION: 96 % | BODY MASS INDEX: 40.29 KG/M2 | TEMPERATURE: 97.4 F | WEIGHT: 265 LBS | DIASTOLIC BLOOD PRESSURE: 84 MMHG | HEART RATE: 68 BPM

## 2024-07-08 DIAGNOSIS — M25.512 CHRONIC LEFT SHOULDER PAIN: ICD-10-CM

## 2024-07-08 DIAGNOSIS — G89.29 CHRONIC LEFT SHOULDER PAIN: ICD-10-CM

## 2024-07-08 DIAGNOSIS — Z12.11 SCREEN FOR COLON CANCER: ICD-10-CM

## 2024-07-08 DIAGNOSIS — I10 BENIGN ESSENTIAL HYPERTENSION: ICD-10-CM

## 2024-07-08 DIAGNOSIS — E11.49 DIABETES MELLITUS TYPE 2 WITH NEUROLOGICAL MANIFESTATIONS (MULTI): Primary | ICD-10-CM

## 2024-07-08 DIAGNOSIS — H61.21 IMPACTED CERUMEN OF RIGHT EAR: ICD-10-CM

## 2024-07-08 LAB — POC HEMOGLOBIN A1C: 5.7 % (ref 4.2–6.5)

## 2024-07-08 PROCEDURE — 69209 REMOVE IMPACTED EAR WAX UNI: CPT | Performed by: FAMILY MEDICINE

## 2024-07-08 PROCEDURE — 99214 OFFICE O/P EST MOD 30 MIN: CPT | Performed by: FAMILY MEDICINE

## 2024-07-08 PROCEDURE — 83036 HEMOGLOBIN GLYCOSYLATED A1C: CPT | Mod: QW | Performed by: FAMILY MEDICINE

## 2024-07-08 PROCEDURE — 1036F TOBACCO NON-USER: CPT | Performed by: FAMILY MEDICINE

## 2024-07-08 PROCEDURE — 3079F DIAST BP 80-89 MM HG: CPT | Performed by: FAMILY MEDICINE

## 2024-07-08 PROCEDURE — 1159F MED LIST DOCD IN RCRD: CPT | Performed by: FAMILY MEDICINE

## 2024-07-08 PROCEDURE — 1123F ACP DISCUSS/DSCN MKR DOCD: CPT | Performed by: FAMILY MEDICINE

## 2024-07-08 PROCEDURE — 1126F AMNT PAIN NOTED NONE PRSNT: CPT | Performed by: FAMILY MEDICINE

## 2024-07-08 PROCEDURE — 1160F RVW MEDS BY RX/DR IN RCRD: CPT | Performed by: FAMILY MEDICINE

## 2024-07-08 PROCEDURE — 1158F ADVNC CARE PLAN TLK DOCD: CPT | Performed by: FAMILY MEDICINE

## 2024-07-08 PROCEDURE — 1157F ADVNC CARE PLAN IN RCRD: CPT | Performed by: FAMILY MEDICINE

## 2024-07-08 PROCEDURE — 4010F ACE/ARB THERAPY RXD/TAKEN: CPT | Performed by: FAMILY MEDICINE

## 2024-07-08 PROCEDURE — 3075F SYST BP GE 130 - 139MM HG: CPT | Performed by: FAMILY MEDICINE

## 2024-07-08 ASSESSMENT — LIFESTYLE VARIABLES
HOW OFTEN DO YOU HAVE SIX OR MORE DRINKS ON ONE OCCASION: NEVER
SKIP TO QUESTIONS 9-10: 1
AUDIT-C TOTAL SCORE: 0
HOW OFTEN DO YOU HAVE A DRINK CONTAINING ALCOHOL: NEVER
HOW MANY STANDARD DRINKS CONTAINING ALCOHOL DO YOU HAVE ON A TYPICAL DAY: PATIENT DOES NOT DRINK

## 2024-07-08 ASSESSMENT — ENCOUNTER SYMPTOMS
DEPRESSION: 0
LOSS OF SENSATION IN FEET: 0
OCCASIONAL FEELINGS OF UNSTEADINESS: 0

## 2024-07-08 ASSESSMENT — PAIN SCALES - GENERAL: PAINLEVEL: 0-NO PAIN

## 2024-07-08 NOTE — PATIENT INSTRUCTIONS
Here for DM2 visit - A1c is 5.7 - well controlled.  Diet controlled.  Recommend yearly eye exam    For Blood pressure - well controlled on lisionpril.     For shoulder pain - order for xray and referral to Dr. Adams for opinion    For leg swelling - continue compression and using walker.     For hearing - cerumen impaction right ear.  Irrigated today. Recommend hearing aid evaluation.     Follow up in 4 months for Medicare physical.

## 2024-07-08 NOTE — PROGRESS NOTES
Subjective   Patient ID: Shon Boyd Jr. is a 68 y.o. male who presents for Diabetes.    Here for follow up.  Semi-retired.      Yesterday - pt has a burning pain in shoulder.  Chronic issue - flared up.      For DM2:  -A1c: 6  -Follow up: Doing well.  Pt is down 10 pounds.  Walking more.  Using walker.  Getting around better.    -Hypoglycemic Episodes: none  -Glucose monitoring:  none    Hypertension  -Patient is here for follow-up of elevated blood pressure.   -Blood pressure is well controlled.  -Cardiac symptoms: none.   -Patient denies fatigue and irregular heart beat.   -Cardiologist:    Depression:  -F/U: doing better on abilify.  Mood improved  -Symptoms have been gradually improving   -Denies suicidal thoughts or ideation  -Current Treatment: abilify and buspar.   -Counseling:   -Previous treatment includes:    Leg swelling  -Pt is more active, wearing compression and using lasix.      Diabetes    Patient ID: Shon Boyd Jr. is a 68 y.o. male.    Ear Cerumen Removal    Date/Time: 7/8/2024 11:48 AM    Performed by: Sanket Delacruz DO  Authorized by: Sanket Delacruz DO    Consent:     Consent obtained:  Verbal    Consent given by:  Patient    Risks, benefits, and alternatives were discussed: yes      Risks discussed:  Bleeding, pain and TM perforation    Alternatives discussed:  No treatment  Universal protocol:     Patient identity confirmed:  Verbally with patient  Procedure details:     Location:  R ear    Procedure type: irrigation      Procedure outcomes: cerumen removed    Post-procedure details:     Inspection:  No bleeding, ear canal clear and TM intact    Hearing quality:  Normal    Procedure completion:  Tolerated well, no immediate complications       Review of Systems    Objective   /84 (BP Location: Left arm, Patient Position: Sitting)   Pulse 68   Temp 36.3 °C (97.4 °F) (Temporal)   Wt 120 kg (265 lb)   SpO2 96%   BMI 40.29 kg/m²     Physical Exam  Vitals reviewed.    Constitutional:       General: He is not in acute distress.     Appearance: He is obese.   HENT:      Right Ear: There is impacted cerumen.      Left Ear: Tympanic membrane and ear canal normal.   Cardiovascular:      Rate and Rhythm: Normal rate and regular rhythm.   Pulmonary:      Effort: Pulmonary effort is normal.      Breath sounds: No wheezing or rhonchi.   Musculoskeletal:      Right lower leg: No edema.      Left lower leg: No edema.      Comments: AFO right foot, using walker   Lymphadenopathy:      Cervical: No cervical adenopathy.   Neurological:      Mental Status: He is alert.         Assessment/Plan   Diagnoses and all orders for this visit:  Benign essential hypertension  Diabetes mellitus type 2 with neurological manifestations (Multi)  -     POCT glycosylated hemoglobin (Hb A1C) manually resulted  Chronic left shoulder pain  -     Referral to Orthopaedic Surgery; Future  -     XR shoulder left 2+ views; Future  Screen for colon cancer  -     Cologuard® colon cancer screening; Future  Other orders  -     Ear Cerumen Removal    Patient Instructions   Here for DM2 visit - A1c is 5.7 - well controlled.  Diet controlled.  Recommend yearly eye exam    For Blood pressure - well controlled on lisionpril.     For shoulder pain - order for xray and referral to Dr. Adams for opinion    For leg swelling - continue compression and using walker.     For hearing - cerumen impaction right ear.  Irrigated today. Recommend hearing aid evaluation.     Follow up in 4 months for Medicare physical.

## 2024-07-09 DIAGNOSIS — K21.9 GERD WITHOUT ESOPHAGITIS: ICD-10-CM

## 2024-07-10 RX ORDER — MONTELUKAST SODIUM 4 MG/1
1 TABLET, CHEWABLE ORAL DAILY
Qty: 90 TABLET | Refills: 0 | Status: SHIPPED | OUTPATIENT
Start: 2024-07-10

## 2024-07-10 RX ORDER — PANTOPRAZOLE SODIUM 40 MG/1
TABLET, DELAYED RELEASE ORAL
Qty: 90 TABLET | Refills: 0 | Status: SHIPPED | OUTPATIENT
Start: 2024-07-10

## 2024-08-06 DIAGNOSIS — M79.89 SWELLING OF RIGHT LOWER EXTREMITY: ICD-10-CM

## 2024-08-07 RX ORDER — FUROSEMIDE 20 MG/1
TABLET ORAL
Qty: 30 TABLET | Refills: 4 | Status: SHIPPED | OUTPATIENT
Start: 2024-08-07

## 2024-08-07 RX ORDER — L. ACIDOPHILUS/L.BULGARICUS 1MM CELL
TABLET ORAL
Qty: 150 TABLET | Refills: 0 | Status: SHIPPED | OUTPATIENT
Start: 2024-08-07

## 2024-08-16 ENCOUNTER — TELEPHONE (OUTPATIENT)
Dept: PRIMARY CARE | Facility: CLINIC | Age: 69
End: 2024-08-16
Payer: MEDICARE

## 2024-08-16 NOTE — TELEPHONE ENCOUNTER
Jocelyn from [facility] calling to follow up on the [employer] forms faxed to us on 08-07-24 and a second time on 08-07-24. Jocelyn is stating the PT cannot get his supplies until they receive these forms back. Jocelyn's # [phone]

## 2024-08-23 LAB — NONINV COLON CA DNA+OCC BLD SCRN STL QL: NEGATIVE

## 2024-09-05 DIAGNOSIS — N40.1 BENIGN NODULAR PROSTATIC HYPERPLASIA WITH LOWER URINARY TRACT SYMPTOMS: ICD-10-CM

## 2024-09-05 RX ORDER — FINASTERIDE 5 MG/1
TABLET, FILM COATED ORAL
Qty: 90 TABLET | Refills: 1 | Status: SHIPPED | OUTPATIENT
Start: 2024-09-05

## 2024-09-06 ENCOUNTER — TELEPHONE (OUTPATIENT)
Dept: PRIMARY CARE | Facility: CLINIC | Age: 69
End: 2024-09-06
Payer: MEDICARE

## 2024-10-08 DIAGNOSIS — K21.9 GERD WITHOUT ESOPHAGITIS: ICD-10-CM

## 2024-10-08 RX ORDER — MONTELUKAST SODIUM 4 MG/1
TABLET, CHEWABLE ORAL
Qty: 90 TABLET | Refills: 0 | Status: SHIPPED | OUTPATIENT
Start: 2024-10-08

## 2024-10-08 RX ORDER — PANTOPRAZOLE SODIUM 40 MG/1
TABLET, DELAYED RELEASE ORAL
Qty: 90 TABLET | Refills: 0 | Status: SHIPPED | OUTPATIENT
Start: 2024-10-08

## 2024-10-18 DIAGNOSIS — G40.909 SEIZURE DISORDER (MULTI): ICD-10-CM

## 2024-10-18 RX ORDER — PHENYTOIN SODIUM 100 MG/1
CAPSULE, EXTENDED RELEASE ORAL
Qty: 180 CAPSULE | Refills: 3 | Status: SHIPPED | OUTPATIENT
Start: 2024-10-18

## 2024-10-24 ENCOUNTER — TELEPHONE (OUTPATIENT)
Dept: PRIMARY CARE | Facility: CLINIC | Age: 69
End: 2024-10-24
Payer: MEDICARE

## 2024-10-24 NOTE — TELEPHONE ENCOUNTER
Received fax today that contains needed paperwork. Aware it will be Monday before being signed and returned d/t  out of office

## 2024-10-24 NOTE — TELEPHONE ENCOUNTER
Refill:  Adult Briefs Almost out of the briefs    [facility] a faxed request sent

## 2024-10-28 ENCOUNTER — TELEPHONE (OUTPATIENT)
Dept: PRIMARY CARE | Facility: CLINIC | Age: 69
End: 2024-10-28
Payer: MEDICARE

## 2024-11-01 DIAGNOSIS — M97.8XXA PERIPROSTHETIC FRACTURE OF SHAFT OF FEMUR: ICD-10-CM

## 2024-11-01 DIAGNOSIS — Z96.649 PERIPROSTHETIC FRACTURE OF SHAFT OF FEMUR: ICD-10-CM

## 2024-11-01 RX ORDER — MULTIVITAMIN
TABLET ORAL
Qty: 180 TABLET | Refills: 0 | Status: SHIPPED | OUTPATIENT
Start: 2024-11-01

## 2024-11-22 ENCOUNTER — APPOINTMENT (OUTPATIENT)
Dept: PRIMARY CARE | Facility: CLINIC | Age: 69
End: 2024-11-22
Payer: MEDICARE

## 2024-12-03 DIAGNOSIS — K21.9 GERD WITHOUT ESOPHAGITIS: ICD-10-CM

## 2024-12-03 DIAGNOSIS — M79.89 SWELLING OF RIGHT LOWER EXTREMITY: ICD-10-CM

## 2024-12-03 RX ORDER — FUROSEMIDE 20 MG/1
TABLET ORAL
Qty: 30 TABLET | Refills: 3 | Status: SHIPPED | OUTPATIENT
Start: 2024-12-03

## 2024-12-03 RX ORDER — L. ACIDOPHILUS/L.BULGARICUS 1MM CELL
TABLET ORAL
Qty: 150 TABLET | Refills: 3 | Status: SHIPPED | OUTPATIENT
Start: 2024-12-03

## 2024-12-03 RX ORDER — PANTOPRAZOLE SODIUM 40 MG/1
40 TABLET, DELAYED RELEASE ORAL DAILY
Qty: 90 TABLET | Refills: 3 | Status: SHIPPED | OUTPATIENT
Start: 2024-12-03

## 2024-12-03 RX ORDER — MONTELUKAST SODIUM 4 MG/1
1 TABLET, CHEWABLE ORAL DAILY
Qty: 90 TABLET | Refills: 3 | Status: SHIPPED | OUTPATIENT
Start: 2024-12-03

## 2024-12-11 DIAGNOSIS — J30.9 ALLERGIC RHINITIS, UNSPECIFIED SEASONALITY, UNSPECIFIED TRIGGER: ICD-10-CM

## 2024-12-11 RX ORDER — FLUTICASONE PROPIONATE 50 MCG
SPRAY, SUSPENSION (ML) NASAL
Qty: 16 ML | Refills: 1 | Status: SHIPPED | OUTPATIENT
Start: 2024-12-11

## 2025-01-31 ENCOUNTER — TELEPHONE (OUTPATIENT)
Dept: PRIMARY CARE | Facility: CLINIC | Age: 70
End: 2025-01-31
Payer: MEDICARE

## 2025-01-31 DIAGNOSIS — G40.909 SEIZURE DISORDER (MULTI): ICD-10-CM

## 2025-01-31 DIAGNOSIS — I10 BENIGN ESSENTIAL HYPERTENSION: ICD-10-CM

## 2025-01-31 DIAGNOSIS — N40.1 BENIGN NODULAR PROSTATIC HYPERPLASIA WITH LOWER URINARY TRACT SYMPTOMS: ICD-10-CM

## 2025-01-31 DIAGNOSIS — M97.8XXA PERIPROSTHETIC FRACTURE OF SHAFT OF FEMUR: ICD-10-CM

## 2025-01-31 DIAGNOSIS — Z96.649 PERIPROSTHETIC FRACTURE OF SHAFT OF FEMUR: ICD-10-CM

## 2025-01-31 DIAGNOSIS — J30.9 ALLERGIC RHINITIS, UNSPECIFIED SEASONALITY, UNSPECIFIED TRIGGER: ICD-10-CM

## 2025-01-31 NOTE — TELEPHONE ENCOUNTER
Yovana called. She is asking for the referral for the toilet riser to be clarified. She is asking to know if its between 2-4 inches and if it is regular or elongated. Their FAX is [phone].

## 2025-02-01 RX ORDER — FINASTERIDE 5 MG/1
TABLET, FILM COATED ORAL
Qty: 90 TABLET | Refills: 0 | Status: SHIPPED | OUTPATIENT
Start: 2025-02-01

## 2025-02-01 RX ORDER — PHENYTOIN SODIUM 100 MG/1
CAPSULE, EXTENDED RELEASE ORAL
Qty: 180 CAPSULE | Refills: 2 | Status: SHIPPED | OUTPATIENT
Start: 2025-02-01

## 2025-02-01 RX ORDER — LISINOPRIL 5 MG/1
5 TABLET ORAL DAILY
Qty: 90 TABLET | Refills: 2 | Status: SHIPPED | OUTPATIENT
Start: 2025-02-01

## 2025-02-01 RX ORDER — MULTIVITAMIN
TABLET ORAL
Qty: 180 TABLET | Refills: 0 | Status: SHIPPED | OUTPATIENT
Start: 2025-02-01

## 2025-02-01 RX ORDER — CETIRIZINE HYDROCHLORIDE 10 MG/1
TABLET ORAL
Qty: 90 TABLET | Refills: 4 | Status: SHIPPED | OUTPATIENT
Start: 2025-02-01

## 2025-02-03 ENCOUNTER — TELEPHONE (OUTPATIENT)
Dept: PRIMARY CARE | Facility: CLINIC | Age: 70
End: 2025-02-03
Payer: MEDICARE

## 2025-02-03 DIAGNOSIS — M79.89 SWELLING OF RIGHT LOWER EXTREMITY: ICD-10-CM

## 2025-02-03 NOTE — TELEPHONE ENCOUNTER
Aleena Contreras a sight manager (where PT lives) from [facility] calling stating they received a prescription     Floranex to take once a day at 8:00AM. She is asking if this prescription was from Dr. Delacruz and why he is on it.    AND    They are Missing a prescription for the medication:    lactobacillus acidophilus (Lactobacillus acidoph-L.bulgar) tablet tablet GIVE 1 TABLET BY MOUTH ONCE DAILY     She is stating he takes this once daily at 9:30 PM.    Aleena is asking for the information and prescription to be faxed to her at [phone], Phone # [phone]

## 2025-02-05 NOTE — TELEPHONE ENCOUNTER
Ritojesus Ben Phone # [phone]  is [facility], she is aware of the message, is there a script you fax over so when they pass out meds it's included, Fax [phone]

## 2025-02-06 RX ORDER — L. ACIDOPHILUS/L.BULGARICUS 1MM CELL
1 TABLET ORAL DAILY
Qty: 90 TABLET | Refills: 3 | Status: SHIPPED | OUTPATIENT
Start: 2025-02-06

## 2025-02-06 NOTE — TELEPHONE ENCOUNTER
Cynthia aware that patient is to still be on the probiotic (lactobilus) and that I am faxing the order over now.  PL

## 2025-02-06 NOTE — TELEPHONE ENCOUNTER
Cynthia the sight manager from the PT's group home calling from # [phone]. She is stating she requested the medication:     lactobacillus acidophilus (Lactobacillus acidoph-L.bulgar) tablet tablet Take 1 tablet by mouth once daily.     She stated the pharmacy's response is that this medication has been discontinued. She is calling to confirm with the  If this medication was in fact discontinued or not. Please call Cynthia at [phone]

## 2025-02-07 NOTE — TELEPHONE ENCOUNTER
Cynthia called because they have not received the faxed prescription.  Can it be emailed to her at [email or web address]

## 2025-02-28 DIAGNOSIS — E11.49 DIABETES MELLITUS TYPE 2 WITH NEUROLOGICAL MANIFESTATIONS (MULTI): ICD-10-CM

## 2025-02-28 RX ORDER — ASPIRIN 81 MG/1
81 TABLET ORAL DAILY
Qty: 90 TABLET | Refills: 2 | Status: SHIPPED | OUTPATIENT
Start: 2025-02-28

## 2025-03-06 DIAGNOSIS — J30.9 ALLERGIC RHINITIS, UNSPECIFIED SEASONALITY, UNSPECIFIED TRIGGER: ICD-10-CM

## 2025-03-06 RX ORDER — FLUTICASONE PROPIONATE 50 MCG
SPRAY, SUSPENSION (ML) NASAL
Qty: 18 ML | Refills: 2 | Status: SHIPPED | OUTPATIENT
Start: 2025-03-06

## 2025-03-14 ENCOUNTER — APPOINTMENT (OUTPATIENT)
Dept: PRIMARY CARE | Facility: CLINIC | Age: 70
End: 2025-03-14
Payer: MEDICARE

## 2025-04-01 DIAGNOSIS — M79.89 SWELLING OF RIGHT LOWER EXTREMITY: ICD-10-CM

## 2025-04-01 RX ORDER — FUROSEMIDE 20 MG/1
TABLET ORAL
Qty: 30 TABLET | Refills: 2 | Status: SHIPPED | OUTPATIENT
Start: 2025-04-01

## 2025-04-17 ENCOUNTER — TELEPHONE (OUTPATIENT)
Dept: PRIMARY CARE | Facility: CLINIC | Age: 70
End: 2025-04-17
Payer: MEDICARE

## 2025-04-17 NOTE — TELEPHONE ENCOUNTER
Pt health home helper called stating that his wheelchair cushion is torn and that he needs a new seat. Daniel did call the company where he got this chair from and they told her that they would need a letter from dr greer stating that it is okay for him to get a new seat.     [employer] is the company and their number is [phone]

## 2025-04-17 NOTE — LETTER
April 18, 2025     Shon Boyd .    Patient: Shon Boyd Jr.   YOB: 1955           To Whom it May Concern,    Our patient, Shon Boyd, is under the are of our office.  Mr. Boyd requires the use of a wheel chair due to multiple health concerns.  His current wheelchair has a torn seat cushion and requires a replacement.  Please replace seat cushion.  Let me know if any concerns or issues.        Sincerely,     Sanket Delacruz, DO      CC: No Recipients  ______________________________________________________________________________________

## 2025-05-01 DIAGNOSIS — N40.1 BENIGN NODULAR PROSTATIC HYPERPLASIA WITH LOWER URINARY TRACT SYMPTOMS: ICD-10-CM

## 2025-05-01 DIAGNOSIS — Z96.649 PERIPROSTHETIC FRACTURE OF SHAFT OF FEMUR: ICD-10-CM

## 2025-05-01 DIAGNOSIS — G40.909 SEIZURE DISORDER (MULTI): ICD-10-CM

## 2025-05-01 DIAGNOSIS — M97.8XXA PERIPROSTHETIC FRACTURE OF SHAFT OF FEMUR: ICD-10-CM

## 2025-05-01 RX ORDER — PHENYTOIN SODIUM 100 MG/1
CAPSULE, EXTENDED RELEASE ORAL
Qty: 180 CAPSULE | Refills: 1 | Status: SHIPPED | OUTPATIENT
Start: 2025-05-01

## 2025-05-01 RX ORDER — FINASTERIDE 5 MG/1
5 TABLET, FILM COATED ORAL DAILY
Qty: 90 TABLET | Refills: 0 | Status: SHIPPED | OUTPATIENT
Start: 2025-05-01

## 2025-05-01 RX ORDER — MULTIVITAMIN
TABLET ORAL
Qty: 180 TABLET | Refills: 0 | Status: SHIPPED | OUTPATIENT
Start: 2025-05-01

## 2025-05-16 ENCOUNTER — TELEPHONE (OUTPATIENT)
Dept: PRIMARY CARE | Facility: CLINIC | Age: 70
End: 2025-05-16
Payer: MEDICARE

## 2025-05-16 DIAGNOSIS — R09.81 SINUS CONGESTION: Primary | ICD-10-CM

## 2025-05-16 RX ORDER — MONTELUKAST SODIUM 10 MG/1
10 TABLET ORAL NIGHTLY PRN
Qty: 30 TABLET | Refills: 5 | Status: SHIPPED | OUTPATIENT
Start: 2025-05-16 | End: 2025-11-12

## 2025-05-16 NOTE — TELEPHONE ENCOUNTER
Jesika called back to follow up on her call. She was read 's message and has no questions at this time.    Jesika's # [phone]

## 2025-05-16 NOTE — TELEPHONE ENCOUNTER
Home care provider called to find out which allergy medication you think Mr. Boyd can take.  Please call & advise.  Thank you

## 2025-05-16 NOTE — TELEPHONE ENCOUNTER
Jesika , the sight manager with [facility] calling to follow up on her call from this morning regarding allergy medications. She was read Dr. Delacruz's message. She is stating the medication Zyrtec is not his PRN medication list. She states he is willing to try the Singulair as well. She states he has congestion and a cough that comes and goes.     Pharmacy:  [facility]/pharmacy #[id number] [facility] [id number] [place] [address] Phone: [phone]   Fax: [phone]        Please call Jesika with a update.    Jesika, Home Sight Manager's #[phone]

## 2025-06-27 DIAGNOSIS — G40.909 SEIZURE DISORDER (MULTI): ICD-10-CM

## 2025-06-27 DIAGNOSIS — M79.89 SWELLING OF RIGHT LOWER EXTREMITY: ICD-10-CM

## 2025-06-28 RX ORDER — PHENYTOIN SODIUM 100 MG/1
CAPSULE, EXTENDED RELEASE ORAL
Qty: 180 CAPSULE | Refills: 0 | Status: SHIPPED | OUTPATIENT
Start: 2025-06-28

## 2025-06-28 RX ORDER — FUROSEMIDE 20 MG/1
20 TABLET ORAL DAILY
Qty: 30 TABLET | Refills: 1 | Status: SHIPPED | OUTPATIENT
Start: 2025-06-28

## 2025-07-29 DIAGNOSIS — M97.8XXA PERIPROSTHETIC FRACTURE OF SHAFT OF FEMUR: ICD-10-CM

## 2025-07-29 DIAGNOSIS — Z96.649 PERIPROSTHETIC FRACTURE OF SHAFT OF FEMUR: ICD-10-CM

## 2025-07-29 DIAGNOSIS — G40.909 SEIZURE DISORDER (MULTI): ICD-10-CM

## 2025-07-29 DIAGNOSIS — N40.1 BENIGN NODULAR PROSTATIC HYPERPLASIA WITH LOWER URINARY TRACT SYMPTOMS: ICD-10-CM

## 2025-07-30 RX ORDER — FINASTERIDE 5 MG/1
5 TABLET, FILM COATED ORAL DAILY
Qty: 90 TABLET | Refills: 0 | Status: SHIPPED | OUTPATIENT
Start: 2025-07-30

## 2025-07-30 RX ORDER — PHENYTOIN SODIUM 100 MG/1
CAPSULE, EXTENDED RELEASE ORAL
Qty: 180 CAPSULE | Refills: 0 | Status: SHIPPED | OUTPATIENT
Start: 2025-07-30

## 2025-07-30 RX ORDER — MULTIVITAMIN
1 TABLET ORAL
Qty: 180 TABLET | Refills: 0 | Status: SHIPPED | OUTPATIENT
Start: 2025-07-30

## 2025-08-08 DIAGNOSIS — J30.9 ALLERGIC RHINITIS, UNSPECIFIED SEASONALITY, UNSPECIFIED TRIGGER: ICD-10-CM

## 2025-08-08 RX ORDER — FLUTICASONE PROPIONATE 50 MCG
SPRAY, SUSPENSION (ML) NASAL
Qty: 18 ML | Refills: 1 | Status: SHIPPED | OUTPATIENT
Start: 2025-08-08

## 2025-08-28 DIAGNOSIS — M79.89 SWELLING OF RIGHT LOWER EXTREMITY: ICD-10-CM

## 2025-08-28 DIAGNOSIS — G40.909 SEIZURE DISORDER (MULTI): ICD-10-CM

## 2025-08-28 RX ORDER — PHENYTOIN SODIUM 100 MG/1
CAPSULE, EXTENDED RELEASE ORAL
Qty: 180 CAPSULE | Refills: 0 | Status: SHIPPED | OUTPATIENT
Start: 2025-08-28

## 2025-08-28 RX ORDER — FUROSEMIDE 20 MG/1
20 TABLET ORAL DAILY
Qty: 30 TABLET | Refills: 0 | Status: SHIPPED | OUTPATIENT
Start: 2025-08-28